# Patient Record
Sex: FEMALE | Race: BLACK OR AFRICAN AMERICAN | NOT HISPANIC OR LATINO | Employment: OTHER | URBAN - METROPOLITAN AREA
[De-identification: names, ages, dates, MRNs, and addresses within clinical notes are randomized per-mention and may not be internally consistent; named-entity substitution may affect disease eponyms.]

---

## 2018-04-18 ENCOUNTER — HOSPITAL ENCOUNTER (EMERGENCY)
Facility: HOSPITAL | Age: 53
Discharge: HOME | End: 2018-04-18
Attending: FAMILY MEDICINE
Payer: MEDICAID

## 2018-04-18 VITALS
HEIGHT: 65 IN | BODY MASS INDEX: 40.98 KG/M2 | SYSTOLIC BLOOD PRESSURE: 186 MMHG | DIASTOLIC BLOOD PRESSURE: 89 MMHG | OXYGEN SATURATION: 98 % | HEART RATE: 77 BPM | WEIGHT: 246 LBS | RESPIRATION RATE: 18 BRPM | TEMPERATURE: 99 F

## 2018-04-18 DIAGNOSIS — H66.92 ACUTE LEFT OTITIS MEDIA: ICD-10-CM

## 2018-04-18 DIAGNOSIS — R51.9 SINUS HEADACHE: Primary | ICD-10-CM

## 2018-04-18 PROCEDURE — 99283 EMERGENCY DEPT VISIT LOW MDM: CPT | Mod: ,,, | Performed by: FAMILY MEDICINE

## 2018-04-18 PROCEDURE — 99283 EMERGENCY DEPT VISIT LOW MDM: CPT

## 2018-04-18 RX ORDER — BUTALBITAL, ACETAMINOPHEN AND CAFFEINE 50; 325; 40 MG/1; MG/1; MG/1
1-2 TABLET ORAL EVERY 6 HOURS PRN
Qty: 18 TABLET | Refills: 0 | Status: SHIPPED | OUTPATIENT
Start: 2018-04-18

## 2018-04-18 RX ORDER — AMOXICILLIN AND CLAVULANATE POTASSIUM 875; 125 MG/1; MG/1
1 TABLET, FILM COATED ORAL 2 TIMES DAILY
Qty: 20 TABLET | Refills: 0 | Status: SHIPPED | OUTPATIENT
Start: 2018-04-18 | End: 2018-04-28

## 2018-04-18 NOTE — ED NOTES
Patient identifiers verified and correct for Ms Mercado  C/C: Left sided headache, left ear pain   APPEARANCE: awake and alert in NAD.  SKIN: warm, dry and intact. No breakdown or bruising.  MUSCULOSKELETAL: Patient moving all extremities spontaneously, no obvious swelling or deformities noted. Ambulates independently.  RESPIRATORY: Denies shortness of breath.Respirations unlabored.   CARDIAC: Denies CP, 2+ distal pulses; no peripheral edema  ABDOMEN: S/ND/NT, Positive nausea, emesis last night  : voids spontaneously, denies difficulty  Neurologic: AAO x 4; follows commands equal strength in all extremities; denies numbness/tingling. Positive dizziness, positive weakness, headache behind left eye, left neck,left ear.

## 2018-04-18 NOTE — ED TRIAGE NOTES
Patient statesback of left eye pain, left head and  left neck pain with entire body aches onset last night. ALso concerned about left ear pain.Last Percocet 10 mg and Soma yesterday. Last use crack cocaine yesterday

## 2018-04-18 NOTE — ED PROVIDER NOTES
[id number] Date: 2018    SCRIBE #1 NOTE: I, Sarah Chang, am scribing for, and in the presence of,  Dr. Cohen. I have scribed the following portions of the note - Other sections scribed: HPI, ROS, PE.       History     Chief Complaint   Patient presents with    Headache     Time patient was seen by the provider: 10:41 AM      The patient is a 52 y.o. female with co-morbidities including: HLD, HTN, who presents to the ED with a complaint of headache that started yesterday. The patient reports headache spread all over head, face, and neck. Associated symptoms include chills, 2 x of vomiting, light-headedness, weakness, cough, left ear pain. Patient states she took tylenol last night giving no relief. She has history of sinus problems.            The history is provided by the patient and medical records.     Review of patient's allergies indicates:  No Known Allergies  Past Medical History:   Diagnosis Date    Arthritis     Asthma     as child    Hyperlipemia     Hypertension      Past Surgical History:   Procedure Laterality Date     SECTION, CLASSIC       Family History   Problem Relation Age of Onset    Stroke Mother     Hypertension Mother     Heart attack Father      Social History   Substance Use Topics    Smoking status: Current Some Day Smoker     Packs/day: 0.50     Types: Cigarettes    Smokeless tobacco: Never Used    Alcohol use Yes      Comment: occas, none recently     Review of Systems   Constitutional: Positive for chills. Negative for fever.   HENT: Positive for ear pain (left). Negative for sore throat.    Respiratory: Positive for cough. Negative for shortness of breath.    Cardiovascular: Negative for chest pain.   Gastrointestinal: Positive for vomiting. Negative for nausea.   Genitourinary: Negative for dysuria.   Musculoskeletal: Negative for back pain.   Skin: Negative for rash.   Neurological: Positive for weakness, light-headedness and headaches.    Hematological: Does not bruise/bleed easily.       Physical Exam     Initial Vitals [04/18/18 1012]   BP Pulse Resp Temp SpO2   (!) 186/89 77 18 98.9 °F (37.2 °C) 98 %      MAP       121.33         Physical Exam    Nursing note and vitals reviewed.  Constitutional: She appears well-developed and well-nourished. No distress.   HENT:   Head: Normocephalic and atraumatic.   Left Ear: Tympanic membrane is bulging.   Mouth/Throat: Oropharynx is clear and moist.   Fluid in left middle ear. Post-nasal drip    Eyes: EOM are normal. Pupils are equal, round, and reactive to light.   Neck: Normal range of motion. Neck supple.   Cardiovascular: Normal rate, regular rhythm and normal heart sounds.   Pulmonary/Chest: Breath sounds normal. No respiratory distress. She has no wheezes.   Tenderness to percussion.    Abdominal: Soft. Bowel sounds are normal. She exhibits no distension. There is no tenderness.   Musculoskeletal: Normal range of motion. She exhibits no edema or tenderness.   No palpable edema   Neurological: She is alert and oriented to person, place, and time. She has normal strength.   Skin: Skin is warm and dry. No rash noted.         ED Course   Procedures  Labs Reviewed - No data to display          Medical Decision Making:   History:   Old Medical Records: I decided to obtain old medical records.  ED Management:  No acute CNS pathology suspected after reviewing patient's history and exam.  We'll manage sinusitis with Augmentin and use Fioricet for when necessary headache pain.  Patient stable for discharge            Scribe Attestation:   Scribe #1: I performed the above scribed service and the documentation accurately describes the services I performed. I attest to the accuracy of the note.               Clinical Impression:   The primary encounter diagnosis was Sinus headache. A diagnosis of Acute left otitis media was also pertinent to this visit.    Disposition:   Disposition: Discharged  Condition:  Stable                        Madhav Cohen MD  04/18/18 1128

## 2018-04-18 NOTE — PROVIDER PROGRESS NOTES - EMERGENCY DEPT.
Encounter Date: 4/18/2018    ED Physician Progress Notes             Patient not in room @ 10:35 AM

## 2019-08-29 ENCOUNTER — HOSPITAL ENCOUNTER (EMERGENCY)
Facility: HOSPITAL | Age: 54
Discharge: HOME | End: 2019-08-29
Attending: EMERGENCY MEDICINE
Payer: MEDICAID

## 2019-08-29 VITALS
WEIGHT: 234 LBS | SYSTOLIC BLOOD PRESSURE: 244 MMHG | HEART RATE: 75 BPM | OXYGEN SATURATION: 95 % | TEMPERATURE: 99 F | BODY MASS INDEX: 38.99 KG/M2 | DIASTOLIC BLOOD PRESSURE: 110 MMHG | RESPIRATION RATE: 20 BRPM | HEIGHT: 65 IN

## 2019-08-29 DIAGNOSIS — R06.02 SHORTNESS OF BREATH: ICD-10-CM

## 2019-08-29 DIAGNOSIS — J44.1 CHRONIC OBSTRUCTIVE PULMONARY DISEASE WITH ACUTE EXACERBATION: Primary | ICD-10-CM

## 2019-08-29 LAB
ALBUMIN SERPL BCP-MCNC: 3.5 G/DL (ref 3.5–5.2)
ALP SERPL-CCNC: 95 U/L (ref 55–135)
ALT SERPL W/O P-5'-P-CCNC: 21 U/L (ref 10–44)
ANION GAP SERPL CALC-SCNC: 12 MMOL/L (ref 8–16)
AST SERPL-CCNC: 28 U/L (ref 10–40)
BASOPHILS # BLD AUTO: 0.03 K/UL (ref 0–0.2)
BASOPHILS NFR BLD: 0.2 % (ref 0–1.9)
BILIRUB SERPL-MCNC: 0.3 MG/DL (ref 0.1–1)
BUN SERPL-MCNC: 18 MG/DL (ref 6–20)
CALCIUM SERPL-MCNC: 9.4 MG/DL (ref 8.7–10.5)
CHLORIDE SERPL-SCNC: 105 MMOL/L (ref 95–110)
CO2 SERPL-SCNC: 24 MMOL/L (ref 23–29)
CREAT SERPL-MCNC: 0.9 MG/DL (ref 0.5–1.4)
DIFFERENTIAL METHOD: ABNORMAL
EOSINOPHIL # BLD AUTO: 0 K/UL (ref 0–0.5)
EOSINOPHIL NFR BLD: 0 % (ref 0–8)
ERYTHROCYTE [DISTWIDTH] IN BLOOD BY AUTOMATED COUNT: 13.2 % (ref 11.5–14.5)
EST. GFR  (AFRICAN AMERICAN): >60 ML/MIN/1.73 M^2
EST. GFR  (NON AFRICAN AMERICAN): >60 ML/MIN/1.73 M^2
GLUCOSE SERPL-MCNC: 141 MG/DL (ref 70–110)
HCT VFR BLD AUTO: 38.5 % (ref 37–48.5)
HGB BLD-MCNC: 12.8 G/DL (ref 12–16)
IMM GRANULOCYTES # BLD AUTO: 0.16 K/UL (ref 0–0.04)
IMM GRANULOCYTES NFR BLD AUTO: 0.8 % (ref 0–0.5)
LYMPHOCYTES # BLD AUTO: 1.5 K/UL (ref 1–4.8)
LYMPHOCYTES NFR BLD: 8.1 % (ref 18–48)
MCH RBC QN AUTO: 29.2 PG (ref 27–31)
MCHC RBC AUTO-ENTMCNC: 33.2 G/DL (ref 32–36)
MCV RBC AUTO: 88 FL (ref 82–98)
MONOCYTES # BLD AUTO: 0.8 K/UL (ref 0.3–1)
MONOCYTES NFR BLD: 4 % (ref 4–15)
NEUTROPHILS # BLD AUTO: 16.4 K/UL (ref 1.8–7.7)
NEUTROPHILS NFR BLD: 86.9 % (ref 38–73)
NRBC BLD-RTO: 0 /100 WBC
PLATELET # BLD AUTO: 253 K/UL (ref 150–350)
PMV BLD AUTO: 11.2 FL (ref 9.2–12.9)
POTASSIUM SERPL-SCNC: 4 MMOL/L (ref 3.5–5.1)
PROT SERPL-MCNC: 7.5 G/DL (ref 6–8.4)
RBC # BLD AUTO: 4.38 M/UL (ref 4–5.4)
SODIUM SERPL-SCNC: 141 MMOL/L (ref 136–145)
WBC # BLD AUTO: 18.83 K/UL (ref 3.9–12.7)

## 2019-08-29 PROCEDURE — 80053 COMPREHEN METABOLIC PANEL: CPT

## 2019-08-29 PROCEDURE — 93010 EKG 12-LEAD: ICD-10-PCS | Mod: ,,, | Performed by: INTERNAL MEDICINE

## 2019-08-29 PROCEDURE — 93010 ELECTROCARDIOGRAM REPORT: CPT | Mod: ,,, | Performed by: INTERNAL MEDICINE

## 2019-08-29 PROCEDURE — 94640 AIRWAY INHALATION TREATMENT: CPT

## 2019-08-29 PROCEDURE — 85025 COMPLETE CBC W/AUTO DIFF WBC: CPT

## 2019-08-29 PROCEDURE — 63600175 PHARM REV CODE 636 W HCPCS: Performed by: PHYSICIAN ASSISTANT

## 2019-08-29 PROCEDURE — 99285 EMERGENCY DEPT VISIT HI MDM: CPT | Mod: 25

## 2019-08-29 PROCEDURE — 36000 PLACE NEEDLE IN VEIN: CPT

## 2019-08-29 PROCEDURE — 99288: ICD-10-PCS | Mod: ,,, | Performed by: PHYSICIAN ASSISTANT

## 2019-08-29 PROCEDURE — 93005 ELECTROCARDIOGRAM TRACING: CPT

## 2019-08-29 PROCEDURE — 99288 DIRECT ADVANCED LIFE SUPPORT: CPT | Mod: ,,, | Performed by: PHYSICIAN ASSISTANT

## 2019-08-29 PROCEDURE — 25000242 PHARM REV CODE 250 ALT 637 W/ HCPCS: Performed by: PHYSICIAN ASSISTANT

## 2019-08-29 PROCEDURE — 25000003 PHARM REV CODE 250: Performed by: PHYSICIAN ASSISTANT

## 2019-08-29 RX ORDER — NIFEDIPINE 30 MG/1
60 TABLET, EXTENDED RELEASE ORAL
Status: COMPLETED | OUTPATIENT
Start: 2019-08-29 | End: 2019-08-29

## 2019-08-29 RX ORDER — PREDNISONE 20 MG/1
40 TABLET ORAL
Status: COMPLETED | OUTPATIENT
Start: 2019-08-29 | End: 2019-08-29

## 2019-08-29 RX ORDER — NIFEDIPINE 30 MG/1
60 TABLET, EXTENDED RELEASE ORAL DAILY
Status: DISCONTINUED | OUTPATIENT
Start: 2019-08-30 | End: 2019-08-29

## 2019-08-29 RX ORDER — HYDROCHLOROTHIAZIDE 25 MG/1
25 TABLET ORAL
Status: COMPLETED | OUTPATIENT
Start: 2019-08-29 | End: 2019-08-29

## 2019-08-29 RX ORDER — DOXYCYCLINE HYCLATE 100 MG
100 TABLET ORAL
Status: COMPLETED | OUTPATIENT
Start: 2019-08-29 | End: 2019-08-29

## 2019-08-29 RX ORDER — IPRATROPIUM BROMIDE AND ALBUTEROL SULFATE 2.5; .5 MG/3ML; MG/3ML
3 SOLUTION RESPIRATORY (INHALATION)
Status: COMPLETED | OUTPATIENT
Start: 2019-08-29 | End: 2019-08-29

## 2019-08-29 RX ADMIN — IPRATROPIUM BROMIDE AND ALBUTEROL SULFATE 3 ML: .5; 3 SOLUTION RESPIRATORY (INHALATION) at 06:08

## 2019-08-29 RX ADMIN — PREDNISONE 40 MG: 20 TABLET ORAL at 07:08

## 2019-08-29 RX ADMIN — NIFEDIPINE 60 MG: 30 TABLET, FILM COATED, EXTENDED RELEASE ORAL at 07:08

## 2019-08-29 RX ADMIN — DOXYCYCLINE HYCLATE 100 MG: 100 TABLET, COATED ORAL at 07:08

## 2019-08-29 RX ADMIN — HYDROCHLOROTHIAZIDE 25 MG: 25 TABLET ORAL at 07:08

## 2019-08-29 NOTE — DISCHARGE INSTRUCTIONS
Fill and start all medications prescribed earlier today by [facility].

## 2019-08-29 NOTE — ED NOTES
Patient identifiers verified and correct for Ms Mercado  C/C: SOB SEE NN  APPEARANCE: awake and alert in NAD.  SKIN: warm, dry and intact. No breakdown or bruising.  MUSCULOSKELETAL: Patient moving all extremities spontaneously, no obvious swelling or deformities noted. Ambulates independently.  RESPIRATORY: Denies shortness of breath.Respirations sl labored at rest. Positive cough, NPC,Positive fever PTA, Breath Sounds audible rhonchi  CARDIAC: Denies CP, 2+ distal pulses; no peripheral edema  ABDOMEN: Upper abd  Pain gen , worse with cough, states nausea PTA, none upon admit   : voids spontaneously, denies difficulty  Neurologic: AAO x 4; follows commands equal strength in all extremities; denies numbness/tingling. Denies dizziness Positive gen weakness, sore throat, runny nose, headache

## 2019-08-29 NOTE — EKG INTERPRETATIONS - EMERGENCY DEPT.
Independently interpreted by MD:  Rate 63, NSR, no stemi, no ectopy, biatrial enlargement, LVH, T wave abnormality in V3-6, long QT

## 2019-08-29 NOTE — ED PROVIDER NOTES
Encounter Date: 2019       History     Chief Complaint   Patient presents with    Nasal Congestion     with cough x 5 days. States just released from hospital in [facility] but states doesnt feel any better. Hx COPD     55 y/o AAF with history of COPD, hypertension, anxiety presents to the ED complaining of shortness of breath for the past 5 days.  She reports chest tightness, fever, dry cough, sore throat, rhinorrhea, headache. She was admitted last night at [facility] and discharged this morning.  She states that she does not feel any better, in fact she feels worse so drove directly from the hospital to our ED.  She was prescribed antibiotics, steroids but she did not pick any of these medications up.  She has not taken any of her at-home medications today.  She is not on home oxygen.  She denies any sick contacts.  She denies chest pain, abdominal pain, nausea, vomiting, diarrhea, dysuria.  She smokes 1/2 pack per day and smokes cocaine, last use 4 days ago.    The history is provided by the patient.     Review of patient's allergies indicates:  No Known Allergies  Past Medical History:   Diagnosis Date    Arthritis     Asthma     as child    Hyperlipemia     Hypertension      Past Surgical History:   Procedure Laterality Date     SECTION, CLASSIC       Family History   Problem Relation Age of Onset    Stroke Mother     Hypertension Mother     Heart attack Father      Social History     Tobacco Use    Smoking status: Current Some Day Smoker     Packs/day: 0.50     Types: Cigarettes    Smokeless tobacco: Never Used   Substance Use Topics    Alcohol use: Yes     Comment: occas, none recently    Drug use: Yes     Types: Cocaine     Comment: smoke crack cocaine 4 days PTA     Review of Systems   Constitutional: Positive for chills and fever.   HENT: Positive for congestion, rhinorrhea and sore throat.    Respiratory: Positive for cough, chest tightness, shortness of  breath and wheezing.    Cardiovascular: Negative for chest pain and palpitations.   Gastrointestinal: Negative for abdominal pain, constipation, diarrhea, nausea and vomiting.   Genitourinary: Negative for dysuria and hematuria.   Musculoskeletal: Negative for back pain, neck pain and neck stiffness.   Skin: Negative for rash and wound.   Neurological: Positive for headaches. Negative for numbness.   Psychiatric/Behavioral: Negative for confusion.       Physical Exam     Initial Vitals   BP Pulse Resp Temp SpO2   08/29/19 1656 08/29/19 1652 08/29/19 1652 08/29/19 1652 08/29/19 1652   (!) 244/110 75 (!) 22 99.1 °F (37.3 °C) 95 %      MAP       --                Physical Exam    Nursing note and vitals reviewed.  Constitutional: She appears well-developed and well-nourished. She is not diaphoretic. No distress.   HENT:   Head: Normocephalic and atraumatic.   Neck: Normal range of motion. Neck supple.   Cardiovascular: Normal rate, regular rhythm and normal heart sounds. Exam reveals no gallop and no friction rub.    No murmur heard.  Pulmonary/Chest: Breath sounds normal. She has no wheezes. She has no rhonchi. She has no rales. She exhibits no tenderness.   Abdominal: Soft. Bowel sounds are normal. There is no tenderness. There is no rebound and no guarding.   Musculoskeletal: Normal range of motion.   Neurological: She is alert and oriented to person, place, and time.   Skin: Skin is warm and dry. No rash noted. No erythema.   Psychiatric: She has a normal mood and affect.         ED Course   Procedures  Labs Reviewed   CBC W/ AUTO DIFFERENTIAL - Abnormal; Notable for the following components:       Result Value    WBC 18.83 (*)     Immature Granulocytes 0.8 (*)     Gran # (ANC) 16.4 (*)     Immature Grans (Abs) 0.16 (*)     Gran% 86.9 (*)     Lymph% 8.1 (*)     All other components within normal limits   COMPREHENSIVE METABOLIC PANEL - Abnormal; Notable for the following components:    Glucose 141 (*)     All other  components within normal limits          Imaging Results          X-Ray Chest PA And Lateral (Final result)  Result time 08/29/19 18:20:09    Final result by Aquilino Mccain MD (08/29/19 18:20:09)                 Impression:      No acute cardiopulmonary process.      Electronically signed by: Aquilino Mccain MD  Date:    08/29/2019  Time:    18:20             Narrative:    EXAMINATION:  XR CHEST PA AND LATERAL    CLINICAL HISTORY:  shortness of breath;    TECHNIQUE:  PA and lateral views of the chest were performed.    COMPARISON:  08/02/2016    FINDINGS:  There is no consolidation, effusion, or pneumothorax.  Discoid atelectasis/scarring noted in the right lung base.  Cardiomediastinal silhouette is unremarkable.  Regional osseous structures are unremarkable.                                 Medical Decision Making:   History:   Old Medical Records: I decided to obtain old medical records.  Old Records Summarized: records from another hospital.       <> Summary of Records: Admitted at [facility] last night, discharged this morning for COPD exacerbation. Given duoneb and IV solumedrol. Discharged with rx for doxycycline, prednisone, albuterol, nifedipine  Clinical Tests:   Lab Tests: Ordered and Reviewed  Radiological Study: Ordered and Reviewed  Medical Tests: Reviewed and Ordered       APC / Resident Notes:   55 y/o AAF with history of COPD, hypertension, anxiety presents to the ED complaining of shortness of breath for the past 5 days.  Hypertensive at 244/110.  She has not taken any of her at-home medications today.  No chest wall tenderness.  Lungs are clear without any wheezing but was receiving steroids and breathing treatments at [facility] today.  Abdomen is soft and nontender. Differential diagnosis includes but is not limited to pneumonia, COPD exacerbation, anemia, dehydration.  Will check labs, chest x-ray, EKG.  Will give home BP meds, DuoNeb, po prednisone.    Leukocytosis noted - given IV  solumedrol yesterday. CMP unremarkable.     CXR independently reviewed - no infiltrate.    She was given DuoNeb, prednisone, doxycycline, BP medications in the ED.     She is on appropriate therapy from OSH. I do not feel that she needs any further labs or imaging at this time. Stable for discharge.     She left the ED before discharge paperwork could be provided. She was discharged without any new prescriptions - instructed to fill all prescriptions provided by OSH.  She will follow up with her PCP.  All of the patient's questions were answered.  I reviewed the patient's chart, labs, and imaging and discussed the case with my supervising physician.                      Clinical Impression:       ICD-10-CM ICD-9-CM   1. Chronic obstructive pulmonary disease with acute exacerbation J44.1 491.21   2. Shortness of breath R06.02 786.05         Disposition:   Disposition: Discharged  Condition: Stable                        Chyna Elena PA-C  08/29/19 [id number]

## 2019-08-29 NOTE — ED TRIAGE NOTES
Patient states SOB onset 5 days ago, states she left another hospital 1 hour PTA and does not feel any better. NPC, states fever to 101 at 0200. Last Percocet at 1200 today. Has not used in [facility] in several years.

## 2019-08-30 NOTE — ED NOTES
Pt left without discharge paper work and vitals being done. Called name multiple times and checked restrooms.

## 2024-08-04 ENCOUNTER — HOSPITAL ENCOUNTER (EMERGENCY)
Facility: HOSPITAL | Age: 59
Discharge: HOME | End: 2024-08-05
Attending: STUDENT IN AN ORGANIZED HEALTH CARE EDUCATION/TRAINING PROGRAM
Payer: MEDICAID

## 2024-08-04 DIAGNOSIS — S32.040A CLOSED COMPRESSION FRACTURE OF L4 LUMBAR VERTEBRA, INITIAL ENCOUNTER: Primary | ICD-10-CM

## 2024-08-04 DIAGNOSIS — R52 PAIN: ICD-10-CM

## 2024-08-04 DIAGNOSIS — R60.0 LOWER EXTREMITY EDEMA: ICD-10-CM

## 2024-08-04 LAB
ALBUMIN SERPL BCP-MCNC: 3.5 G/DL (ref 3.5–5.2)
ALP SERPL-CCNC: 82 U/L (ref 55–135)
ALT SERPL W/O P-5'-P-CCNC: 6 U/L (ref 10–44)
ANION GAP SERPL CALC-SCNC: 10 MMOL/L (ref 8–16)
AST SERPL-CCNC: 9 U/L (ref 10–40)
BASOPHILS # BLD AUTO: 0.03 K/UL (ref 0–0.2)
BASOPHILS NFR BLD: 0.5 % (ref 0–1.9)
BILIRUB SERPL-MCNC: 0.3 MG/DL (ref 0.1–1)
BILIRUB UR QL STRIP: NEGATIVE
BUN SERPL-MCNC: 18 MG/DL (ref 6–20)
CALCIUM SERPL-MCNC: 9.3 MG/DL (ref 8.7–10.5)
CHLORIDE SERPL-SCNC: 112 MMOL/L (ref 95–110)
CLARITY UR REFRACT.AUTO: CLEAR
CO2 SERPL-SCNC: 20 MMOL/L (ref 23–29)
COLOR UR AUTO: YELLOW
CREAT SERPL-MCNC: 0.8 MG/DL (ref 0.5–1.4)
DIFFERENTIAL METHOD BLD: ABNORMAL
EOSINOPHIL # BLD AUTO: 0.3 K/UL (ref 0–0.5)
EOSINOPHIL NFR BLD: 3.9 % (ref 0–8)
ERYTHROCYTE [DISTWIDTH] IN BLOOD BY AUTOMATED COUNT: 13.7 % (ref 11.5–14.5)
EST. GFR  (NO RACE VARIABLE): >60 ML/MIN/1.73 M^2
GLUCOSE SERPL-MCNC: 103 MG/DL (ref 70–110)
GLUCOSE UR QL STRIP: NEGATIVE
HCT VFR BLD AUTO: 38.7 % (ref 37–48.5)
HGB BLD-MCNC: 12.3 G/DL (ref 12–16)
HGB UR QL STRIP: NEGATIVE
IMM GRANULOCYTES # BLD AUTO: 0.01 K/UL (ref 0–0.04)
IMM GRANULOCYTES NFR BLD AUTO: 0.2 % (ref 0–0.5)
KETONES UR QL STRIP: NEGATIVE
LEUKOCYTE ESTERASE UR QL STRIP: NEGATIVE
LYMPHOCYTES # BLD AUTO: 1.9 K/UL (ref 1–4.8)
LYMPHOCYTES NFR BLD: 30 % (ref 18–48)
MCH RBC QN AUTO: 26.7 PG (ref 27–31)
MCHC RBC AUTO-ENTMCNC: 31.8 G/DL (ref 32–36)
MCV RBC AUTO: 84 FL (ref 82–98)
MONOCYTES # BLD AUTO: 0.5 K/UL (ref 0.3–1)
MONOCYTES NFR BLD: 7.7 % (ref 4–15)
NEUTROPHILS # BLD AUTO: 3.7 K/UL (ref 1.8–7.7)
NEUTROPHILS NFR BLD: 57.7 % (ref 38–73)
NITRITE UR QL STRIP: NEGATIVE
NRBC BLD-RTO: 0 /100 WBC
PH UR STRIP: 5 [PH] (ref 5–8)
PLATELET # BLD AUTO: 252 K/UL (ref 150–450)
PMV BLD AUTO: 9.9 FL (ref 9.2–12.9)
POTASSIUM SERPL-SCNC: 3.4 MMOL/L (ref 3.5–5.1)
PROT SERPL-MCNC: 7.1 G/DL (ref 6–8.4)
PROT UR QL STRIP: NEGATIVE
RBC # BLD AUTO: 4.61 M/UL (ref 4–5.4)
SODIUM SERPL-SCNC: 142 MMOL/L (ref 136–145)
SP GR UR STRIP: 1.02 (ref 1–1.03)
URN SPEC COLLECT METH UR: NORMAL
WBC # BLD AUTO: 6.36 K/UL (ref 3.9–12.7)

## 2024-08-04 PROCEDURE — 81003 URINALYSIS AUTO W/O SCOPE: CPT

## 2024-08-04 PROCEDURE — 80053 COMPREHEN METABOLIC PANEL: CPT

## 2024-08-04 PROCEDURE — 85025 COMPLETE CBC W/AUTO DIFF WBC: CPT

## 2024-08-04 PROCEDURE — 99285 EMERGENCY DEPT VISIT HI MDM: CPT | Mod: 25

## 2024-08-04 NOTE — ED NOTES
LOC: The patient is awake and alert; oriented x 3 and speaking appropriately.  APPEARANCE: Patient resting comfortably, patient is clean and well groomed  SKIN: warm and dry, normal skin turgor & moist mucus membranes, skin intact, no breakdown noted.  MUSCULOSKELETAL: Patient moving all extremities well, no obvious swelling or deformities noted, rt arm weakness post CVA  RESPIRATORY: Airway is open and patent,  respirations are spontaneous, normal effort and rate  CARDIAC: Patient has a normal rate, peripheral edema noted to both upper and lower legs, capillary refill < 3 seconds; No complaints of chest pain   ABDOMEN: Soft and non tender to palpation, no distention noted. C/o urinary  incontinence when she stands.9 began after fall one week ago)

## 2024-08-04 NOTE — ED TRIAGE NOTES
Fell last wk  and  fell onto her buttocks while trying to get into a truck. Has been experiencing  urinary incontinence when she stands which started after the fall . C/O edema to  both upper and lower legs w/ pain in knees. Can walk short distances  an uses a motorized w/c for distances. Has weakness on rt side post CVA.

## 2024-08-04 NOTE — ED PROVIDER NOTES
Encounter Date: 2024       History     Chief Complaint   Patient presents with    Knee Pain     Denies fever, chills.      58-year-old female presents to the emergency department with multiple complaints. The patient reports experiencing left knee pain and bilateral lower extremity edema, with the left leg being more affected than the right. She is having difficulty walking due to the pain. She usually uses an electric wheelchair for long distances but can typically walk short distances.  She mentions that about two weeks ago, she experienced a ground-level fall, landing on her buttocks. Initially, she had lower lumbar discomfort, which has since improved. However, she now reports that she urinates on herself primarily in the morning when she gets up to go to the restroom. The patient denies any numbness in the lower extremities.  No saddle anesthesia.  No headaches.  No vision changes.        Review of patient's allergies indicates:  No Known Allergies  Past Medical History:   Diagnosis Date    Arthritis     Asthma     as child    CVA (cerebral vascular accident)     Hyperlipemia     Hypertension      Past Surgical History:   Procedure Laterality Date     SECTION, CLASSIC       Family History   Problem Relation Name Age of Onset    Stroke Mother      Hypertension Mother      Heart attack Father       Social History     Tobacco Use    Smoking status: Some Days     Current packs/day: 0.50     Types: Cigarettes    Smokeless tobacco: Never   Substance Use Topics    Alcohol use: Yes     Comment: occas, none recently    Drug use: Yes     Types: Cocaine     Comment: smoke crack cocaine 4 days PTA     Review of Systems  See HPI  Physical Exam     Initial Vitals [24 1519]   BP Pulse Resp Temp SpO2   137/72 82 16 99.1 °F (37.3 °C) 95 %      MAP       --         Physical Exam    Vitals reviewed.  Constitutional: She appears well-developed and well-nourished.   HENT:   Head: Normocephalic and atraumatic.    Eyes: Conjunctivae and EOM are normal.   Neck:   Normal range of motion.  Cardiovascular:  Normal rate.           Pulmonary/Chest: No respiratory distress.   Abdominal: Abdomen is soft. She exhibits no distension.   Musculoskeletal:         General: Normal range of motion.      Cervical back: Normal range of motion.      Comments: Intact range of motion left knee joint with flexion-extension, minimal edema of the left leg compared to the right.  No overlying skin changes such as abrasion erythema induration.  The joint is not warm to the touch.    Midline lumbar discomfort on exam, without overlying skin changes.     Neurological: She is alert and oriented to person, place, and time.   Skin: Skin is warm and dry.   Psychiatric: She has a normal mood and affect. Thought content normal.         ED Course   Procedures  Labs Reviewed   CBC W/ AUTO DIFFERENTIAL - Abnormal       Result Value    WBC 6.36      RBC 4.61      Hemoglobin 12.3      Hematocrit 38.7      MCV 84      MCH 26.7 (*)     MCHC 31.8 (*)     RDW 13.7      Platelets 252      MPV 9.9      Immature Granulocytes 0.2      Gran # (ANC) 3.7      Immature Grans (Abs) 0.01      Lymph # 1.9      Mono # 0.5      Eos # 0.3      Baso # 0.03      nRBC 0      Gran % 57.7      Lymph % 30.0      Mono % 7.7      Eosinophil % 3.9      Basophil % 0.5      Differential Method Automated     COMPREHENSIVE METABOLIC PANEL - Abnormal    Sodium 142      Potassium 3.4 (*)     Chloride 112 (*)     CO2 20 (*)     Glucose 103      BUN 18      Creatinine 0.8      Calcium 9.3      Total Protein 7.1      Albumin 3.5      Total Bilirubin 0.3      Alkaline Phosphatase 82      AST 9 (*)     ALT 6 (*)     eGFR >60.0      Anion Gap 10     URINALYSIS, REFLEX TO URINE CULTURE    Specimen UA Urine, Clean Catch      Color, UA Yellow      Appearance, UA Clear      pH, UA 5.0      Specific Gravity, UA 1.025      Protein, UA Negative      Glucose, UA Negative      Ketones, UA Negative       Bilirubin (UA) Negative      Occult Blood UA Negative      Nitrite, UA Negative      Leukocytes, UA Negative      Narrative:     Specimen Source->Urine          Imaging Results              X-ray Thoracolumbar Spine Lateral Supine and Lateral Upright (In process)                      X-ray Thoracolumbar Spine Lateral Supine and Lateral Upright (Final result)  Result time 08/05/24 00:00:34      Final result by Juan Abreu MD (08/05/24 00:00:34)                   Impression:      As above      Electronically signed by: Juan Abreu  Date:    08/05/2024  Time:    00:00               Narrative:    EXAMINATION:  XR THORACOLUMBAR SPINE LATERAL SUPINE AND LATERAL UPRIGHT    CLINICAL HISTORY:  L4 compression fx;    TECHNIQUE:  Supine and erect lateral views of the thoracolumbar spine were obtained.    COMPARISON:  None    FINDINGS:  No significant motion between supine and erect views of the lumbar spine with compression deformity at L4.                                       MRI Lumbar Spine Without Contrast (Final result)  Result time 08/04/24 20:23:08      Final result by Juan Abreu MD (08/04/24 20:23:08)                   Impression:      Mild bone marrow edema in the compression fracture of L4 suggesting subacute to acute nature.    No retropulsion.    Moderate central and lateral recess and subarticular stenosis at L4-5 apparently due to lumbar degeneration and congenital short pedicles.      Electronically signed by: Juan Abreu  Date:    08/04/2024  Time:    20:23               Narrative:    EXAMINATION:  MRI LUMBAR SPINE WITHOUT CONTRAST    CLINICAL HISTORY:  Compression fracture, lumbar;    TECHNIQUE:  Multiplanar, multisequence MR images were acquired from the thoracolumbar junction to the sacrum without the administration of contrast.    COMPARISON:  CT lumbar spine, 08/04/2024 at 16:43 hours    FINDINGS:  There is no bone marrow edema in the compression of the superior endplate of  L3.    Mild bone marrow edema signal in the L4 vertebral body.  No retropulsion.    There is moderate lateral recess and foraminal compromise at the L4-5 level due to short pedicles and hypertrophic facet and ligamentous changes posteriorly.  No retropulsion.  Mild concentric disc bulge.    Mild disc bulge at L2-3 L3-4 and L5-S1 with no significant stenosis.  Exiting nerve roots appear un impinged with the exception of the L4 nerve root at L4-5 in the subarticular foramen from hypertrophic changes.                                       CT Cervical Spine Without Contrast (Final result)  Result time 08/04/24 20:00:59      Final result by William Paige MD (08/04/24 20:00:59)                   Impression:      1. Allowing for motion artifact, no convincing acute displaced fracture or dislocation of the cervical spine.  Please see above for additional findings.      Electronically signed by: William Paige MD  Date:    08/04/2024  Time:    20:00               Narrative:    EXAMINATION:  CT CERVICAL SPINE WITHOUT CONTRAST    CLINICAL HISTORY:  Neck trauma, midline tenderness (Age 16-64y);    TECHNIQUE:  Low dose axial images, sagittal and coronal reformations were performed though the cervical spine.  Contrast was not administered.    COMPARISON:  None    FINDINGS:  There is motion artifact.    The visualized portions of the lung apices are remarkable for biapical atelectasis or scarring noting pulmonary emphysematous change.  The thyroid gland, parotid glands, and remaining visualized salivary glands are grossly unremarkable.  No significant tonsillar enlargement.  No significant cervical lymphadenopathy.  There is carotid vascular calcification.  The posterior paraspinous and hypopharyngeal soft tissues are unremarkable.    Sagittal reformatted imaging demonstrates adequate alignment of the cervical spine without significant vertebral body height loss or disc space height loss.  The facet joints are aligned.  No  acute displaced fracture or dislocation of the cervical spine.  Motion artifact limits evaluation for spondylitic changes.  Allowing for this, there is multilevel mild spinal canal stenosis and neuroforaminal narrowing noting mild to moderate spinal canal stenosis at C4-C5.  The airway is patent.                                       CT Head Without Contrast (Final result)  Result time 08/04/24 19:56:40      Final result by William Paige MD (08/04/24 19:56:40)                   Impression:      1. No acute intracranial abnormalities noting sequela of chronic microvascular ischemic change and senescent change.  2. Focus of suspected encephalomalacia within the left basal ganglia suggesting remote infarct.  No previous examinations are available for comparison, and technically remains age indeterminate.  Correlation with any focal symptomatology attributable to this region recommended.  Further evaluation as warranted.      Electronically signed by: William Paige MD  Date:    08/04/2024  Time:    19:56               Narrative:    EXAMINATION:  CT HEAD WITHOUT CONTRAST    CLINICAL HISTORY:  Head trauma, moderate-severe;    TECHNIQUE:  Low dose axial images were obtained through the head.  Coronal and sagittal reformations were also performed. Contrast was not administered.    COMPARISON:  None.    FINDINGS:  There is motion artifact.    There is generalized cerebral volume loss.  There is hypoattenuation in a periventricular fashion, likely sequela of chronic microvascular ischemic change.There is a focus of hypoattenuation involving the left basal ganglia, likely remote infarct.  There is no evidence of acute major vascular territory infarct, hemorrhage, or mass.  There is no hydrocephalus.  There are no abnormal extra-axial fluid collections.  The paranasal sinuses and mastoid air cells are clear, and there is no evidence of calvarial fracture.  The visualized soft tissues are unremarkable.                                        X-Ray Knee 3 View Left (Final result)  Result time 08/04/24 18:57:20      Final result by William Paige MD (08/04/24 18:57:20)                   Impression:      1. No convincing acute displaced fracture or dislocation of the knee noting degenerative changes.      Electronically signed by: William Paige MD  Date:    08/04/2024  Time:    18:57               Narrative:    EXAMINATION:  XR KNEE 3 VIEW LEFT    CLINICAL HISTORY:  Pain, unspecified    TECHNIQUE:  AP, lateral, and Merchant views of the left knee were performed.    COMPARISON:  None    FINDINGS:  Three views left knee.    There are degenerative changes of the knee noting prominent osteophytosis involving the lateral compartment.  No large knee joint effusion.  No radiopaque foreign body.                                       US Lower Extremity Veins Bilateral (Final result)  Result time 08/04/24 18:13:08      Final result by William Paige MD (08/04/24 18:13:08)                   Impression:      No evidence of deep venous thrombosis in either lower extremity.      Electronically signed by: William Paige MD  Date:    08/04/2024  Time:    18:13               Narrative:    EXAMINATION:  US LOWER EXTREMITY VEINS BILATERAL    CLINICAL HISTORY:  Localized edema    TECHNIQUE:  Duplex and color flow Doppler and dynamic compression was performed of the bilateral lower extremity veins was performed.    COMPARISON:  08/02/2016    FINDINGS:  Duplex and color flow Doppler evaluation does not reveal any evidence of acute venous thrombosis in the common femoral, superficial femoral, greater saphenous, popliteal, peroneal, anterior tibial and posterior tibial veins bilaterally.  There is no reflux to suggest valvular incompetence.                                        CT Lumbar Spine Without Contrast (Final result)  Result time 08/04/24 18:02:55      Final result by Juan Abreu MD (08/04/24 18:02:55)                    Impression:      Compression fractures of L3 and L4 appearing new since 2012.  While each may be acute or subacute, L4 appears the most acute.    No retropulsion or hematoma.    Lumbar stenosis at L3-4 and L4-5 mainly due to concentric disc bulge, short pedicles and hypertrophic posterior elements with subarticular foraminal components.    Consider further evaluation with MRI if clinically warranted.    This report was flagged in Epic as abnormal.      Electronically signed by: Juan Abreu  Date:    08/04/2024  Time:    18:02               Narrative:    EXAMINATION:  CT LUMBAR SPINE WITHOUT CONTRAST    CLINICAL HISTORY:  Back trauma, no prior imaging (Age >= 16y);    TECHNIQUE:  Low-dose axial, sagittal and coronal reformations are obtained through the lumbar spine.  Contrast was not administered.    COMPARISON:  Plain x-ray lumbar spine 08/17/2012    FINDINGS:  Alignment: Maintained.    Vertebral bodies: Compression deformities of L3 and L4 new since 2012.  The L4 compression deformity appears more acute.    Disc: Narrowing of most discs.    Degenerative changes: Mild central canal stenosis at L3-L4 from hypertrophic posterior element changes.  Subarticular foraminal narrowing due to short pedicles and hypertrophic facets.  At L4-L5, concentric disc protrusion, short pedicles and hypertrophic posterior ligamentous is and bony changes result in central and lateral recess stenosis with subarticular foraminal encroachment.    Paraspinal soft tissue: Unremarkable.    Visualized portion of the chest and abdomen: Unremarkable.                                       Medications - No data to display  Medical Decision Making  58-year-old female presents after experiencing ground level fall one-week ago  Initial CT of the lumbar spine revealed L3/L4 compression fracture.  There is a question of possible urinary incontinence discussed with Neurosurgery MRI was ordered.  No retropulsion or concern of cauda equina.  However  additional imaging was recommended by Neurosurgery.  Knee pain not consistent with gout or septic joint.  No obvious deformity.  Patient presents in her electric wheelchair which she typically uses for mobility.  At time of sign-out patient was pending x-ray and follow up with Neurosurgery team for disposition  Patient was signed out to evening physician    Amount and/or Complexity of Data Reviewed  Labs: ordered.  Radiology: ordered. Decision-making details documented in ED Course.               ED Course as of 08/05/24 0054   Sun Aug 04, 2024   1901 CT Lumbar Spine Without Contrast(!)  Reviewed and discussed with Neurosurgery, ordered MRI in additional CT head imaging [CR]      ED Course User Index  [CR] Zulema Schofield PA-C                           Clinical Impression:  Final diagnoses:  [R60.0] Lower extremity edema  [R52] Pain                 Zulema Schofield PA-C  08/05/24 0054

## 2024-08-05 VITALS
HEART RATE: 77 BPM | DIASTOLIC BLOOD PRESSURE: 79 MMHG | TEMPERATURE: 98 F | BODY MASS INDEX: 39.49 KG/M2 | RESPIRATION RATE: 18 BRPM | OXYGEN SATURATION: 98 % | SYSTOLIC BLOOD PRESSURE: 189 MMHG | WEIGHT: 237 LBS | HEIGHT: 65 IN

## 2024-08-05 PROBLEM — S32.040A COMPRESSION FRACTURE OF L4 VERTEBRA: Status: ACTIVE | Noted: 2024-08-05

## 2024-08-05 NOTE — PLAN OF CARE
ANGEL has seen and evaluated pt at bedside. Plan for upright / supine XR w TLSO brace. Full consult note to follow    MD Ihsan Pride - Neurosurgery  [facility]

## 2024-08-05 NOTE — DISCHARGE INSTRUCTIONS
Continue to take Tylenol for pain  Where based brace as instructed by Neurosurgery  You will need to follow up in clinic I have placed a referral and listed clinic information above  If you develop numbness in your lower legs or severe pain we presents to the emergency department

## 2024-08-05 NOTE — PROVIDER PROGRESS NOTES - EMERGENCY DEPT.
Encounter Date: 8/4/2024    ED Physician Progress Notes        Physician Note:   Assumed care of patient at 0100 pending repeat xray (initial performed without brace) and final Neurosurgery recommendations.     Repeat x-ray:  Compression deformities of L4 and L3 are again noted with no significant change between for supine and erect.  The remainder of the vertebral bodies appear stable as well.     Reviewed imaging with patient and family member at the bedside.  CT head findings noted, patient has a history of prior stroke, no acute signs or symptoms of stroke.    Reviewed imaging with Neurosurgery, brace in place.  Neurosurgery recommending discharge with outpatient follow-up.  Patient safe for discharge and outpatient follow up.  Advised scheduling appointment with PCP and return precautions given.  The patient understands and agrees with the plan.  All questions answered prior to discharge.

## 2024-08-05 NOTE — SUBJECTIVE & OBJECTIVE
"(Not in a hospital admission)      Review of patient's allergies indicates:  No Known Allergies    Past Medical History:   Diagnosis Date    Arthritis     Asthma     as child    CVA (cerebral vascular accident)     Hyperlipemia     Hypertension      Past Surgical History:   Procedure Laterality Date     SECTION, CLASSIC       Family History       Problem Relation (Age of Onset)    Heart attack Father    Hypertension Mother    Stroke Mother          Tobacco Use    Smoking status: Some Days     Current packs/day: 0.50     Types: Cigarettes    Smokeless tobacco: Never   Substance and Sexual Activity    Alcohol use: Yes     Comment: occas, none recently    Drug use: Yes     Types: Cocaine     Comment: smoke crack cocaine 4 days PTA    Sexual activity: Yes     Partners: Male     Birth control/protection: None     Review of Systems  Objective:     Weight: 107.5 kg (237 lb)  Body mass index is 39.44 kg/m².  Vital Signs (Most Recent):  Temp: 99.1 °F (37.3 °C) (24 151)  Pulse: 82 (24 151)  Resp: 16 (24 151)  BP: 137/72 (24 151)  SpO2: 95 % (24) Vital Signs (24h Range):  Temp:  [99.1 °F (37.3 °C)] 99.1 °F (37.3 °C)  Pulse:  [82] 82  Resp:  [16] 16  SpO2:  [95 %] 95 %  BP: (137)/(72) 137/72                                 Physical Exam         Neurosurgery Physical Exam    A/Ox3  RUE 4/5 (prior stroke)  RLE 4 HF/KE, 5 DF/PF  LLE grossly full strength  No guerrero  No clonus  SILT    Significant Labs:  Recent Labs   Lab 24  1648         K 3.4*   *   CO2 20*   BUN 18   CREATININE 0.8   CALCIUM 9.3     Recent Labs   Lab 24  1648   WBC 6.36   HGB 12.3   HCT 38.7        No results for input(s): "LABPT", "INR", "APTT" in the last 48 hours.  Microbiology Results (last 7 days)       ** No results found for the last 168 hours. **          All pertinent labs from the last 24 hours have been reviewed.    Significant Diagnostics:  CT: CT Head Without " Contrast    Result Date: 8/4/2024  1. No acute intracranial abnormalities noting sequela of chronic microvascular ischemic change and senescent change. 2. Focus of suspected encephalomalacia within the left basal ganglia suggesting remote infarct.  No previous examinations are available for comparison, and technically remains age indeterminate.  Correlation with any focal symptomatology attributable to this region recommended.  Further evaluation as warranted. Electronically signed by: William Paige MD Date:    08/04/2024 Time:    19:56   MRI: No results found in the last 24 hours.  I have reviewed all pertinent imaging results/findings within the past 24 hours.

## 2024-08-05 NOTE — HPI
Pt w hx of CVA (residual R sided weakness) presents to hospital w/ back pain. She reports a mechanical fall roughly 1 week ago. Denies neck pain, arm pain / numbness, leg pain / numbness. Denies saddle anesthesia or neurogenic claudication symptoms. Says she can feel when she has to go to the bathroom, no issues with bowel motions, but reports she has increased urinary frequency since the fall. Does not take blood thinners. NSGY was consulted for evaluation of L4 compression fracture.

## 2024-08-05 NOTE — CONSULTS
[facility] - Emergency Dept  Neurosurgery  Consult Note    Inpatient consult to Neurosurgery  Consult performed by: Ingrid Casper MD  Consult ordered by: Zulema Schofield, LUIGI        Subjective:     Chief Complaint/Reason for Admission: L4 compression fracture    History of Present Illness: Pt w hx of CVA (residual R sided weakness) presents to hospital w/ back pain. She reports a mechanical fall roughly 1 week ago. Denies neck pain, arm pain / numbness, leg pain / numbness. Denies saddle anesthesia or neurogenic claudication symptoms. Says she can feel when she has to go to the bathroom, no issues with bowel motions, but reports she has increased urinary frequency since the fall. Does not take blood thinners. NSGY was consulted for evaluation of L4 compression fracture.    (Not in a hospital admission)      Review of patient's allergies indicates:  No Known Allergies    Past Medical History:   Diagnosis Date    Arthritis     Asthma     as child    CVA (cerebral vascular accident)     Hyperlipemia     Hypertension      Past Surgical History:   Procedure Laterality Date     SECTION, CLASSIC       Family History       Problem Relation (Age of Onset)    Heart attack Father    Hypertension Mother    Stroke Mother          Tobacco Use    Smoking status: Some Days     Current packs/day: 0.50     Types: Cigarettes    Smokeless tobacco: Never   Substance and Sexual Activity    Alcohol use: Yes     Comment: occas, none recently    Drug use: Yes     Types: Cocaine     Comment: smoke crack cocaine 4 days PTA    Sexual activity: Yes     Partners: Male     Birth control/protection: None     Review of Systems  Objective:     Weight: 107.5 kg (237 lb)  Body mass index is 39.44 kg/m².  Vital Signs (Most Recent):  Temp: 99.1 °F (37.3 °C) (24)  Pulse: 82 (24 151)  Resp: 16 (24)  BP: 137/72 (24)  SpO2: 95 % (24) Vital Signs (24h Range):  Temp:  [99.1 °F (37.3 °C)] 99.1 °F  "(37.3 °C)  Pulse:  [82] 82  Resp:  [16] 16  SpO2:  [95 %] 95 %  BP: (137)/(72) 137/72                                 Physical Exam         Neurosurgery Physical Exam    A/Ox3  RUE 4/5 (prior stroke)  RLE 4 HF/KE, 5 DF/PF  LLE grossly full strength  No guerrero  No clonus  SILT    Significant Labs:  Recent Labs   Lab 08/04/24  1648         K 3.4*   *   CO2 20*   BUN 18   CREATININE 0.8   CALCIUM 9.3     Recent Labs   Lab 08/04/24  1648   WBC 6.36   HGB 12.3   HCT 38.7        No results for input(s): "LABPT", "INR", "APTT" in the last 48 hours.  Microbiology Results (last 7 days)       ** No results found for the last 168 hours. **          All pertinent labs from the last 24 hours have been reviewed.    Significant Diagnostics:  CT: CT Head Without Contrast    Result Date: 8/4/2024  1. No acute intracranial abnormalities noting sequela of chronic microvascular ischemic change and senescent change. 2. Focus of suspected encephalomalacia within the left basal ganglia suggesting remote infarct.  No previous examinations are available for comparison, and technically remains age indeterminate.  Correlation with any focal symptomatology attributable to this region recommended.  Further evaluation as warranted. Electronically signed by: William Paige MD Date:    08/04/2024 Time:    19:56   MRI: No results found in the last 24 hours.  I have reviewed all pertinent imaging results/findings within the past 24 hours.  Assessment/Plan:     Compression fracture of L4 vertebra  58F w/ hx of prior strokes presents with back pain after a mechanical fall. NSGY consulted for evaluation of L4 compression fracture.    Imaging:  -CTH 8/4: prior L BG stroke, no acute findings   CT c 8/4: no fx or dislocation   MRI L 8/4: moderate canal stenosis   L4-5 CT L 8/4: L4 compression fx , old L3 compression fx    Plan:  -NSGY has seen and evaluated pt at bedside  -All labs and diagnostics reviewed  -Recommend TLSO " brace  -F/u upright / supine XR when wearing TLSO  -NSGY will continue to follow, please call if any changes in exam    Discussed w Dr Herrera        Thank you for your consult. I will follow-up with patient. Please contact us if you have any additional questions.    Ingrid Casper MD  Neurosurgery  [facility] - Emergency Dept

## 2024-08-05 NOTE — ASSESSMENT & PLAN NOTE
58F w/ hx of prior strokes presents with back pain after a mechanical fall. NSGY consulted for evaluation of L4 compression fracture.    Imaging:  -CTH 8/4: prior L BG stroke, no acute findings   CT c 8/4: no fx or dislocation   MRI L 8/4: moderate canal stenosis   L4-5 CT L 8/4: L4 compression fx , old L3 compression fx    Plan:  -NSGY has seen and evaluated pt at bedside  -All labs and diagnostics reviewed  -Recommend TLSO brace  -F/u upright / supine XR when wearing TLSO  -NSGY will continue to follow, please call if any changes in exam    Discussed w Dr Herrera

## 2025-03-16 ENCOUNTER — HOSPITAL ENCOUNTER (OUTPATIENT)
Facility: HOSPITAL | Age: 60
Discharge: HOME | End: 2025-03-19
Attending: STUDENT IN AN ORGANIZED HEALTH CARE EDUCATION/TRAINING PROGRAM
Payer: MEDICAID

## 2025-03-16 DIAGNOSIS — R07.89 CHEST DISCOMFORT: ICD-10-CM

## 2025-03-16 DIAGNOSIS — I24.9 ACS (ACUTE CORONARY SYNDROME): ICD-10-CM

## 2025-03-16 DIAGNOSIS — R91.1 PULMONARY NODULE: ICD-10-CM

## 2025-03-16 DIAGNOSIS — R07.9 CHEST PAIN: Primary | ICD-10-CM

## 2025-03-16 DIAGNOSIS — I50.9 CHF (CONGESTIVE HEART FAILURE): ICD-10-CM

## 2025-03-16 PROBLEM — I10 HTN (HYPERTENSION): Status: ACTIVE | Noted: 2025-03-16

## 2025-03-16 PROBLEM — F19.90 SUBSTANCE USE: Status: ACTIVE | Noted: 2025-03-16

## 2025-03-16 PROBLEM — I63.231 CEREBROVASCULAR ACCIDENT (CVA) DUE TO STENOSIS OF RIGHT CAROTID ARTERY: Status: ACTIVE | Noted: 2025-03-16

## 2025-03-16 PROBLEM — R32 URINARY INCONTINENCE: Status: ACTIVE | Noted: 2025-03-16

## 2025-03-16 LAB
ALBUMIN SERPL BCP-MCNC: 3.6 G/DL (ref 3.5–5.2)
ALP SERPL-CCNC: 111 U/L (ref 40–150)
ALT SERPL W/O P-5'-P-CCNC: 9 U/L (ref 10–44)
ANION GAP SERPL CALC-SCNC: 13 MMOL/L (ref 8–16)
AST SERPL-CCNC: 16 U/L (ref 10–40)
BASOPHILS # BLD AUTO: 0.05 K/UL (ref 0–0.2)
BASOPHILS NFR BLD: 0.7 % (ref 0–1.9)
BILIRUB SERPL-MCNC: 0.6 MG/DL (ref 0.1–1)
BNP SERPL-MCNC: 49 PG/ML (ref 0–99)
BUN SERPL-MCNC: 12 MG/DL (ref 6–20)
CALCIUM SERPL-MCNC: 9.6 MG/DL (ref 8.7–10.5)
CHLORIDE SERPL-SCNC: 109 MMOL/L (ref 95–110)
CHOLEST SERPL-MCNC: 144 MG/DL (ref 120–199)
CHOLEST/HDLC SERPL: 5.3 {RATIO} (ref 2–5)
CO2 SERPL-SCNC: 18 MMOL/L (ref 23–29)
CREAT SERPL-MCNC: 0.8 MG/DL (ref 0.5–1.4)
DIFFERENTIAL METHOD BLD: ABNORMAL
EOSINOPHIL # BLD AUTO: 0.1 K/UL (ref 0–0.5)
EOSINOPHIL NFR BLD: 1.9 % (ref 0–8)
ERYTHROCYTE [DISTWIDTH] IN BLOOD BY AUTOMATED COUNT: 13.2 % (ref 11.5–14.5)
EST. GFR  (NO RACE VARIABLE): >60 ML/MIN/1.73 M^2
ESTIMATED AVG GLUCOSE: 105 MG/DL (ref 68–131)
GLUCOSE SERPL-MCNC: 92 MG/DL (ref 70–110)
GLUCOSE SERPL-MCNC: 94 MG/DL (ref 70–110)
HBA1C MFR BLD: 5.3 % (ref 4–5.6)
HCT VFR BLD AUTO: 41.2 % (ref 37–48.5)
HDLC SERPL-MCNC: 27 MG/DL (ref 40–75)
HDLC SERPL: 18.8 % (ref 20–50)
HGB BLD-MCNC: 13.1 G/DL (ref 12–16)
IMM GRANULOCYTES # BLD AUTO: 0.03 K/UL (ref 0–0.04)
IMM GRANULOCYTES NFR BLD AUTO: 0.4 % (ref 0–0.5)
LDLC SERPL CALC-MCNC: 102 MG/DL (ref 63–159)
LYMPHOCYTES # BLD AUTO: 1.6 K/UL (ref 1–4.8)
LYMPHOCYTES NFR BLD: 22.7 % (ref 18–48)
MAGNESIUM SERPL-MCNC: 1.8 MG/DL (ref 1.6–2.6)
MCH RBC QN AUTO: 27.2 PG (ref 27–31)
MCHC RBC AUTO-ENTMCNC: 31.8 G/DL (ref 32–36)
MCV RBC AUTO: 86 FL (ref 82–98)
MONOCYTES # BLD AUTO: 0.5 K/UL (ref 0.3–1)
MONOCYTES NFR BLD: 7.6 % (ref 4–15)
NEUTROPHILS # BLD AUTO: 4.6 K/UL (ref 1.8–7.7)
NEUTROPHILS NFR BLD: 66.7 % (ref 38–73)
NONHDLC SERPL-MCNC: 117 MG/DL
NRBC BLD-RTO: 0 /100 WBC
PLATELET # BLD AUTO: 247 K/UL (ref 150–450)
PMV BLD AUTO: 9.9 FL (ref 9.2–12.9)
POCT GLUCOSE: 84 MG/DL (ref 70–110)
POTASSIUM SERPL-SCNC: 4.1 MMOL/L (ref 3.5–5.1)
PROT SERPL-MCNC: 8.2 G/DL (ref 6–8.4)
RBC # BLD AUTO: 4.81 M/UL (ref 4–5.4)
SODIUM SERPL-SCNC: 140 MMOL/L (ref 136–145)
TRIGL SERPL-MCNC: 75 MG/DL (ref 30–150)
TROPONIN I SERPL DL<=0.01 NG/ML-MCNC: <3 NG/L (ref 0–14)
WBC # BLD AUTO: 6.88 K/UL (ref 3.9–12.7)

## 2025-03-16 PROCEDURE — 96372 THER/PROPH/DIAG INJ SC/IM: CPT

## 2025-03-16 PROCEDURE — 25000003 PHARM REV CODE 250: Performed by: NURSE PRACTITIONER

## 2025-03-16 PROCEDURE — 83735 ASSAY OF MAGNESIUM: CPT | Performed by: NURSE PRACTITIONER

## 2025-03-16 PROCEDURE — 93010 ELECTROCARDIOGRAM REPORT: CPT | Mod: ,,, | Performed by: INTERNAL MEDICINE

## 2025-03-16 PROCEDURE — 96365 THER/PROPH/DIAG IV INF INIT: CPT

## 2025-03-16 PROCEDURE — 25000003 PHARM REV CODE 250

## 2025-03-16 PROCEDURE — 93005 ELECTROCARDIOGRAM TRACING: CPT

## 2025-03-16 PROCEDURE — 25000242 PHARM REV CODE 250 ALT 637 W/ HCPCS: Performed by: NURSE PRACTITIONER

## 2025-03-16 PROCEDURE — 82962 GLUCOSE BLOOD TEST: CPT

## 2025-03-16 PROCEDURE — 80053 COMPREHEN METABOLIC PANEL: CPT

## 2025-03-16 PROCEDURE — 80061 LIPID PANEL: CPT

## 2025-03-16 PROCEDURE — 25500020 PHARM REV CODE 255: Performed by: STUDENT IN AN ORGANIZED HEALTH CARE EDUCATION/TRAINING PROGRAM

## 2025-03-16 PROCEDURE — 83880 ASSAY OF NATRIURETIC PEPTIDE: CPT

## 2025-03-16 PROCEDURE — G0378 HOSPITAL OBSERVATION PER HR: HCPCS

## 2025-03-16 PROCEDURE — 84484 ASSAY OF TROPONIN QUANT: CPT

## 2025-03-16 PROCEDURE — 96366 THER/PROPH/DIAG IV INF ADDON: CPT

## 2025-03-16 PROCEDURE — 25000242 PHARM REV CODE 250 ALT 637 W/ HCPCS

## 2025-03-16 PROCEDURE — 85025 COMPLETE CBC W/AUTO DIFF WBC: CPT

## 2025-03-16 PROCEDURE — 99285 EMERGENCY DEPT VISIT HI MDM: CPT | Mod: 25

## 2025-03-16 PROCEDURE — 63600175 PHARM REV CODE 636 W HCPCS

## 2025-03-16 PROCEDURE — 84484 ASSAY OF TROPONIN QUANT: CPT | Mod: 91 | Performed by: NURSE PRACTITIONER

## 2025-03-16 PROCEDURE — 83036 HEMOGLOBIN GLYCOSYLATED A1C: CPT

## 2025-03-16 RX ORDER — ATORVASTATIN CALCIUM 40 MG/1
80 TABLET, FILM COATED ORAL DAILY
Status: DISCONTINUED | OUTPATIENT
Start: 2025-03-17 | End: 2025-03-19 | Stop reason: HOSPADM

## 2025-03-16 RX ORDER — GABAPENTIN 600 MG/1
600 TABLET ORAL 3 TIMES DAILY
COMMUNITY

## 2025-03-16 RX ORDER — NALOXONE HCL 0.4 MG/ML
0.02 VIAL (ML) INJECTION
Status: DISCONTINUED | OUTPATIENT
Start: 2025-03-16 | End: 2025-03-19 | Stop reason: HOSPADM

## 2025-03-16 RX ORDER — IPRATROPIUM BROMIDE AND ALBUTEROL SULFATE 2.5; .5 MG/3ML; MG/3ML
3 SOLUTION RESPIRATORY (INHALATION) EVERY 4 HOURS PRN
Status: DISCONTINUED | OUTPATIENT
Start: 2025-03-16 | End: 2025-03-19 | Stop reason: HOSPADM

## 2025-03-16 RX ORDER — LOSARTAN POTASSIUM 50 MG/1
50 TABLET ORAL DAILY
Status: DISCONTINUED | OUTPATIENT
Start: 2025-03-16 | End: 2025-03-16

## 2025-03-16 RX ORDER — HEPARIN SODIUM 5000 [USP'U]/ML
5000 INJECTION, SOLUTION INTRAVENOUS; SUBCUTANEOUS EVERY 8 HOURS
Status: DISCONTINUED | OUTPATIENT
Start: 2025-03-16 | End: 2025-03-19 | Stop reason: HOSPADM

## 2025-03-16 RX ORDER — IBUPROFEN 200 MG
16 TABLET ORAL
Status: DISCONTINUED | OUTPATIENT
Start: 2025-03-16 | End: 2025-03-19 | Stop reason: HOSPADM

## 2025-03-16 RX ORDER — NITROGLYCERIN 0.4 MG/1
0.4 TABLET SUBLINGUAL EVERY 5 MIN PRN
Status: COMPLETED | OUTPATIENT
Start: 2025-03-16 | End: 2025-03-18

## 2025-03-16 RX ORDER — AMLODIPINE BESYLATE 5 MG/1
5 TABLET ORAL DAILY
Status: DISCONTINUED | OUTPATIENT
Start: 2025-03-16 | End: 2025-03-17

## 2025-03-16 RX ORDER — TALC
6 POWDER (GRAM) TOPICAL NIGHTLY PRN
Status: DISCONTINUED | OUTPATIENT
Start: 2025-03-16 | End: 2025-03-19 | Stop reason: HOSPADM

## 2025-03-16 RX ORDER — ONDANSETRON HYDROCHLORIDE 2 MG/ML
4 INJECTION, SOLUTION INTRAVENOUS EVERY 8 HOURS PRN
Status: DISCONTINUED | OUTPATIENT
Start: 2025-03-17 | End: 2025-03-19 | Stop reason: HOSPADM

## 2025-03-16 RX ORDER — NITROGLYCERIN 0.4 MG/1
0.4 TABLET SUBLINGUAL EVERY 5 MIN PRN
Status: COMPLETED | OUTPATIENT
Start: 2025-03-16 | End: 2025-03-16

## 2025-03-16 RX ORDER — AMOXICILLIN 250 MG
1 CAPSULE ORAL 2 TIMES DAILY PRN
Status: DISCONTINUED | OUTPATIENT
Start: 2025-03-16 | End: 2025-03-19 | Stop reason: HOSPADM

## 2025-03-16 RX ORDER — ASPIRIN 325 MG
325 TABLET ORAL
Status: COMPLETED | OUTPATIENT
Start: 2025-03-16 | End: 2025-03-16

## 2025-03-16 RX ORDER — LOSARTAN POTASSIUM 50 MG/1
100 TABLET ORAL DAILY
Status: DISCONTINUED | OUTPATIENT
Start: 2025-03-17 | End: 2025-03-19 | Stop reason: HOSPADM

## 2025-03-16 RX ORDER — IBUPROFEN 200 MG
1 TABLET ORAL DAILY
Status: DISCONTINUED | OUTPATIENT
Start: 2025-03-17 | End: 2025-03-19 | Stop reason: HOSPADM

## 2025-03-16 RX ORDER — GABAPENTIN 300 MG/1
600 CAPSULE ORAL 3 TIMES DAILY
Status: DISCONTINUED | OUTPATIENT
Start: 2025-03-17 | End: 2025-03-19 | Stop reason: HOSPADM

## 2025-03-16 RX ORDER — HYDRALAZINE HYDROCHLORIDE 25 MG/1
25 TABLET, FILM COATED ORAL ONCE
Status: DISCONTINUED | OUTPATIENT
Start: 2025-03-17 | End: 2025-03-17

## 2025-03-16 RX ORDER — ACETAMINOPHEN 325 MG/1
650 TABLET ORAL EVERY 8 HOURS PRN
Status: DISCONTINUED | OUTPATIENT
Start: 2025-03-16 | End: 2025-03-19 | Stop reason: HOSPADM

## 2025-03-16 RX ORDER — BISACODYL 10 MG/1
10 SUPPOSITORY RECTAL DAILY PRN
Status: DISCONTINUED | OUTPATIENT
Start: 2025-03-16 | End: 2025-03-19 | Stop reason: HOSPADM

## 2025-03-16 RX ORDER — ALUMINUM HYDROXIDE, MAGNESIUM HYDROXIDE, AND SIMETHICONE 1200; 120; 1200 MG/30ML; MG/30ML; MG/30ML
30 SUSPENSION ORAL 4 TIMES DAILY PRN
Status: DISCONTINUED | OUTPATIENT
Start: 2025-03-16 | End: 2025-03-19 | Stop reason: HOSPADM

## 2025-03-16 RX ORDER — OXYCODONE AND ACETAMINOPHEN 10; 325 MG/1; MG/1
1 TABLET ORAL EVERY 4 HOURS PRN
Refills: 0 | Status: DISCONTINUED | OUTPATIENT
Start: 2025-03-17 | End: 2025-03-19 | Stop reason: HOSPADM

## 2025-03-16 RX ORDER — DIPHENHYDRAMINE HCL 25 MG
25 CAPSULE ORAL
Status: COMPLETED | OUTPATIENT
Start: 2025-03-16 | End: 2025-03-16

## 2025-03-16 RX ORDER — INSULIN ASPART 100 [IU]/ML
0-10 INJECTION, SOLUTION INTRAVENOUS; SUBCUTANEOUS
Status: DISCONTINUED | OUTPATIENT
Start: 2025-03-16 | End: 2025-03-19 | Stop reason: HOSPADM

## 2025-03-16 RX ORDER — MAGNESIUM SULFATE HEPTAHYDRATE 40 MG/ML
2 INJECTION, SOLUTION INTRAVENOUS ONCE
Status: COMPLETED | OUTPATIENT
Start: 2025-03-17 | End: 2025-03-17

## 2025-03-16 RX ORDER — PROCHLORPERAZINE EDISYLATE 5 MG/ML
5 INJECTION INTRAMUSCULAR; INTRAVENOUS EVERY 6 HOURS PRN
Status: DISCONTINUED | OUTPATIENT
Start: 2025-03-16 | End: 2025-03-19 | Stop reason: HOSPADM

## 2025-03-16 RX ORDER — AMLODIPINE BESYLATE 5 MG/1
5 TABLET ORAL DAILY
Status: ON HOLD | COMMUNITY
Start: 2025-01-11 | End: 2025-03-19 | Stop reason: HOSPADM

## 2025-03-16 RX ORDER — METHOCARBAMOL 500 MG/1
500 TABLET, FILM COATED ORAL ONCE
Status: COMPLETED | OUTPATIENT
Start: 2025-03-17 | End: 2025-03-17

## 2025-03-16 RX ORDER — IBUPROFEN 200 MG
24 TABLET ORAL
Status: DISCONTINUED | OUTPATIENT
Start: 2025-03-16 | End: 2025-03-19 | Stop reason: HOSPADM

## 2025-03-16 RX ORDER — NAPROXEN SODIUM 220 MG/1
81 TABLET, FILM COATED ORAL DAILY
Status: DISCONTINUED | OUTPATIENT
Start: 2025-03-17 | End: 2025-03-19 | Stop reason: HOSPADM

## 2025-03-16 RX ORDER — ACETAMINOPHEN 500 MG
1000 TABLET ORAL ONCE
Status: DISCONTINUED | OUTPATIENT
Start: 2025-03-17 | End: 2025-03-16

## 2025-03-16 RX ORDER — GLUCAGON 1 MG
1 KIT INJECTION
Status: DISCONTINUED | OUTPATIENT
Start: 2025-03-16 | End: 2025-03-19 | Stop reason: HOSPADM

## 2025-03-16 RX ADMIN — NITROGLYCERIN 0.4 MG: 0.4 TABLET, ORALLY DISINTEGRATING SUBLINGUAL at 09:03

## 2025-03-16 RX ADMIN — DIPHENHYDRAMINE HYDROCHLORIDE 25 MG: 25 CAPSULE ORAL at 11:03

## 2025-03-16 RX ADMIN — NITROGLYCERIN 0.4 MG: 0.4 TABLET, ORALLY DISINTEGRATING SUBLINGUAL at 11:03

## 2025-03-16 RX ADMIN — LOSARTAN POTASSIUM 50 MG: 50 TABLET, FILM COATED ORAL at 08:03

## 2025-03-16 RX ADMIN — HEPARIN SODIUM 5000 UNITS: 5000 INJECTION INTRAVENOUS; SUBCUTANEOUS at 09:03

## 2025-03-16 RX ADMIN — TICAGRELOR 90 MG: 90 TABLET ORAL at 09:03

## 2025-03-16 RX ADMIN — IOHEXOL 100 ML: 350 INJECTION, SOLUTION INTRAVENOUS at 11:03

## 2025-03-16 RX ADMIN — AMLODIPINE BESYLATE 5 MG: 5 TABLET ORAL at 10:03

## 2025-03-16 RX ADMIN — ASPIRIN 325 MG ORAL TABLET 325 MG: 325 PILL ORAL at 11:03

## 2025-03-16 NOTE — HPI
60 yo female with Pmhx of COPD, HTN, CVA s/p R carotid stent placed 02/2025 on brilinta/plavix/statin who presents to the ED for acute midsternal chest pain.    Chest pain started at rest at 8pm on 03/16 mostly mid to left sternal with some radiation to the back and up neck but none to the arm. Accompanied by shortness of breath and diaphoresis. Patient went to bed and woke up in the middle of the night with pain still present so she came to the ED. Pain improved after being given NG in the ED.    Prior to this episode patient has never had chest pain or shortness of breath before, either at rest or with exertion. Yesterday patient was at the [employer]. She smoked a vape and had half  daiquiri. Patient smokes cocaine every couple of days but last had cocaine 3 days ago. Patient is unclear what kind of medication she takes except for a BP pill and aspirin.     Social hx:  Lives: with children, uses motorozied wheelchair 2/2 to residual weakness from prior CVA   Works: retired  ETOH: some  Tobacco: 1/2 pack daily  Drugs: cocaine, every couple of days        In the ED: trops negative x2. EKG with no acute ischemic changes. CTA negative for PE. Mildy hypertensive. Heart score of  4.

## 2025-03-16 NOTE — ED PROVIDER NOTES
Encounter Date: 3/16/2025       History     Chief Complaint   Patient presents with    Shortness of Breath     HPI  Tamiko Mercado is a 59 y.o. female, PMH COPD, current smoker, HTN,  presenting with complaints of CP with radiation to the R arm and SOB. Pt reports onset of pain since 8pm last night and decided to come in today when her pain didn't resolve. She reports mid-sternal CP. She reports non-adherence with home BP medications and missed doses of blood thinners. She denies abdominal pain, back pain, vision changes, numbness/tingling. She is joined at bedside by her daughter who helps to provide history. Daughter reports prior to last night she was in her normal state of health and they went to the [employer] yesterday together.     Pt has hx of previous R ICA occulusion with stenting of R carotid approximately one month ago.    Review of patient's allergies indicates:  No Known Allergies  Past Medical History:   Diagnosis Date    Arthritis     Asthma     as child    CVA (cerebral vascular accident)     Hyperlipemia     Hypertension      Past Surgical History:   Procedure Laterality Date     SECTION, CLASSIC       Family History   Problem Relation Name Age of Onset    Stroke Mother      Hypertension Mother      Heart attack Father       Social History[1]  Review of Systems    Physical Exam     Initial Vitals [25 1009]   BP Pulse Resp Temp SpO2   (!) 172/91 83 20 98.6 °F (37 °C) 96 %      MAP       --         Physical Exam    Nursing note and vitals reviewed.  Constitutional: She appears well-developed and well-nourished. She is not diaphoretic. She is active and cooperative.  Non-toxic appearance. She does not appear ill. She appears distressed.   HENT:   Head: Normocephalic.   Eyes: Conjunctivae and EOM are normal. No scleral icterus.   Cardiovascular:  Normal rate, regular rhythm and intact distal pulses.  No extrasystoles are present.    Exam reveals no gallop, no S3, no S4, no distant heart  sounds and no friction rub.       No murmur heard.  Pulmonary/Chest: Breath sounds normal. No accessory muscle usage. No tachypnea. No respiratory distress. She has no decreased breath sounds. She has no wheezes. She has no rhonchi. She has no rales.   Abdominal: Abdomen is soft and protuberant. She exhibits no shifting dullness, no distension and no fluid wave. There is no abdominal tenderness. There is no rebound and no guarding.     Neurological: She is alert. She is not disoriented. GCS score is 15. GCS eye subscore is 4. GCS verbal subscore is 5. GCS motor subscore is 6.   Skin: Skin is warm and dry.   Psychiatric: She has a normal mood and affect. Her behavior is normal. Judgment and thought content normal.         ED Course   Procedures  Labs Reviewed   CBC W/ AUTO DIFFERENTIAL - Abnormal       Result Value    WBC 6.88      RBC 4.81      Hemoglobin 13.1      Hematocrit 41.2      MCV 86      MCH 27.2      MCHC 31.8 (*)     RDW 13.2      Platelets 247      MPV 9.9      Immature Granulocytes 0.4      Gran # (ANC) 4.6      Immature Grans (Abs) 0.03      Lymph # 1.6      Mono # 0.5      Eos # 0.1      Baso # 0.05      nRBC 0      Gran % 66.7      Lymph % 22.7      Mono % 7.6      Eosinophil % 1.9      Basophil % 0.7      Differential Method Automated     COMPREHENSIVE METABOLIC PANEL - Abnormal    Sodium 140      Potassium 4.1      Chloride 109      CO2 18 (*)     Glucose 92      BUN 12      Creatinine 0.8      Calcium 9.6      Total Protein 8.2      Albumin 3.6      Total Bilirubin 0.6      Alkaline Phosphatase 111      AST 16      ALT 9 (*)     eGFR >60.0      Anion Gap 13     TROPONIN I HIGH SENSITIVITY    Troponin I High Sensitivity <3     TROPONIN I HIGH SENSITIVITY    Troponin I High Sensitivity <3     B-TYPE NATRIURETIC PEPTIDE    BNP 49       EKG Readings: (Independently Interpreted)   Initial Reading: No STEMI.   Rate 76. Mild ST changes in II, v4-v6 but not meeting STEMI criteria. Symmetric T waves in II        Imaging Results              CTA Chest Non-Coronary (PE Studies) (Final result)  Result time 03/16/25 13:02:24      Final result by Gianluca Chapa MD (03/16/25 13:02:24)                   Impression:      1. No convincing evidence of pulmonary thromboembolism through the segmental levels, allowing for limitations.  Further evaluation/follow-up as warranted.  2. Basilar predominant nonspecific pulmonary mosaic attenuation which can be seen with small vessels disease including pulmonary edema or small airways process.  No consolidation or pleural effusion.  3. Coronary and systemic atherosclerosis.  Suspected left ventricular hypertrophy.  4. Right lower lobe 2 mm solid pulmonary nodule.  For a solid nodule <6 mm, Fleischner Society 2017 guidelines recommend no routine follow up for a low risk patient, or follow-up with non-contrast chest CT at 12 months in a high risk patient.  5. Other incidental/nonemergent findings in the body of the report.      Electronically signed by: Gianluca Chapa MD  Date:    03/16/2025  Time:    13:02               Narrative:    EXAMINATION:  CTA CHEST NON CORONARY (PE STUDIES)    CLINICAL HISTORY:  Pulmonary embolism (PE) suspected, unknown D-dimer;    TECHNIQUE:  Low dose axial images, sagittal and coronal reformations with axial MIPS were obtained from the thoracic inlet to the lung bases following the IV administration of 75 mL of Omnipaque 350, per PE protocol.    COMPARISON:  Chest radiograph same date, cervical and lumbar spine CT 08/04/2024, CT renal stone study 11/13/2007; report only from outside facility CTA cardiac 02/03/2025    FINDINGS:  Study is somewhat limited by beam hardening with streak artifact from overlying monitoring leads and contrast bolus in the SVC as well as respiratory motion particularly at the lung bases.    Base of Neck: No significant abnormality.    Thoracic soft tissues: Within normal limits.    Aorta: Left-sided 4 vessel aortic arch.  Mildly  tortuous with scattered calcified and noncalcified atherosclerotic plaque.  No aortic aneurysm or dissection seen.    Heart: Normal in overall size, noting suspected left ventricular hypertrophy.  No effusion.  Multi-vessel coronary arterial calcifications with aortic annular calcifications noted.  No cardiac thrombus seen.    Pulmonary vasculature: Pulmonary arteries distribute normally.  There are four pulmonary veins.  Pulmonary trunk is within normal limits.  No saddle embolus.  No convincing evidence of pulmonary thromboembolism through the segmental levels.    Annika/Mediastinum: No pathologic cami enlargement.    Airways: Patent.    Lungs/Pleura: Well expanded.  Mild dependent atelectasis.  Scattered linear opacities consistent with platelike scarring versus atelectasis more so at the right lung base.  Mild patchy nonspecific pulmonary mosaic attenuation with a mostly centrilobular and basilar distribution.  No consolidation.  2 mm solid nodule within the posterolateral right lower lobe (series 3, image 240).  No pneumothorax.  No pleural effusion or thickening.    Esophagus: Within normal limits.    Upper Abdomen: No acute/significant abnormality of the partially imaged upper abdomen.    Bones: Age-related minimal degenerative change.  No acute fracture. No suspicious lytic or sclerotic lesions.                                       X-Ray Chest AP Portable (Final result)  Result time 03/16/25 11:26:42      Final result by Felicita Renteria MD (03/16/25 11:26:42)                   Impression:      No acute cardiopulmonary disease      Electronically signed by: Felicita Renteria  Date:    03/16/2025  Time:    11:26               Narrative:    EXAMINATION:  XR CHEST AP PORTABLE    CLINICAL HISTORY:  Chest Pain;    TECHNIQUE:  Single frontal view of the chest was performed.    COMPARISON:  08/29/2019    FINDINGS:  The lungs are clear.  No pleural effusion.  Heart size is within normal limits.                                        Medications   nitroGLYCERIN SL tablet 0.4 mg (0.4 mg Sublingual Given 3/16/25 1147)   iohexoL (OMNIPAQUE 350) injection 100 mL (has no administration in time range)   aspirin tablet 325 mg (325 mg Oral Given 3/16/25 1109)   iohexoL (OMNIPAQUE 350) injection 100 mL (100 mLs Intravenous Given 3/16/25 1137)   diphenhydrAMINE capsule 25 mg (25 mg Oral Given 3/16/25 1154)     Medical Decision Making  Tamiko Mercado  is a 59 y.o. female, presenting with complaints of Chest pain with SOB, history of present illness obtained from pt and daughter at bedside as seen above. Patient able to provide adequate and detailed  history of present illness and tolerated physical exam well. Patient found to be in moderate distress secondary to pain , stable. Exam notable as above.     DDX includes but is not limited to: ACS, Unstable angina, NSTEMI, COPD exacerbation, PE.   CT-PE without evidence of PE. Pt had rapid improvement of her symptoms with nitro, concerning for ACS vs unstable angina. EKG without ST elevations that meet stemi criteria today. She does have nonspecific changes with ST elevations. No wheezing or respiratory distress and moving air in all fields; unlikely to be COPD exacerbation. Pt give ASA on arrival. Pt with high risk presentation and very suspicious for NSTEMI. We will admit for further provocative testing.     HEART Score for Major Cardiac Events - MDCalcCalculated on Mar 16 2025 4:03 PM7 points -> High Score (7-10 points) Risk of MACE of 50-65%.    See ED course for additional discussion.    Pt does have incidental finding of pulmonary nodule that would not explain today's presentation and can follow up with PCP for this.     Data Reviewed/Counseling: I have reviewed the patient's vital signs, nursing notes, and other relevant tests/information. Any incidental findings were discussed with the patient. I had a detailed discussion with the patient regarding the historical points, exam  findings, and any diagnostic results supporting the diagnosis. Discussed case with , who agreed to evaluate and admit patient.  Disposition: Admitted     Amount and/or Complexity of Data Reviewed  External Data Reviewed: radiology.     Details: No prior Echo for baseline  Labs: ordered. Decision-making details documented in ED Course.  Radiology: ordered.    Risk  OTC drugs.  Prescription drug management.  Decision regarding hospitalization.               ED Course as of 03/16/25 1605   Sun Mar 16, 2025   1015 No STEMI on EKG [NN]   1125 BP(!): 172/91 [HB]   1126 BP(!): 168/96 [HB]   1126 WBC: 6.88 [HB]   1126 Hemoglobin: 13.1 [HB]   1126 Hematocrit: 41.2 [HB]   1126 Dramatic improvement in pain after nitro with improvement in BP also to 140s systolic   [HB]   1140 WBC: 6.88 [HB]   1140 Hemoglobin: 13.1 [HB]   1140 Hematocrit: 41.2 [HB]   1140 Troponin I High Sensitivity: <3  wnl [HB]   1324 BNP: 49 [HB]   1421 Troponin I High Sensitivity: <3  wnl [HB]      ED Course User Index  [HB] Lisa Dominguez MD  [NN] Eleanor Granda MD                           Clinical Impression:  Final diagnoses:  [R07.89] Chest discomfort  [R07.9] Chest pain  [R91.1] Pulmonary nodule (Primary)          ED Disposition Condition    Observation Stable                    [1]   Social History  Tobacco Use    Smoking status: Some Days     Current packs/day: 0.50     Types: Cigarettes    Smokeless tobacco: Never   Substance Use Topics    Alcohol use: Yes     Comment: occas, none recently    Drug use: Yes     Types: Cocaine     Comment: smoke crack cocaine 4 days PTA        Lisa Dominguez MD  Resident  03/16/25 160

## 2025-03-16 NOTE — ED TRIAGE NOTES
Pt arriving to ED c/o SOB and mid sternal chest pain radiating to R arm started last night, stent placed to R neck last month.

## 2025-03-16 NOTE — Clinical Note
Diagnosis: Chest pain [386870]   Future Attending Provider: LUCIANO GARCIA [[id number]]   Is the patient being sent to ED Observation?: No

## 2025-03-17 ENCOUNTER — DOCUMENTATION ONLY (OUTPATIENT)
Dept: CARDIOLOGY | Facility: CLINIC | Age: 60
End: 2025-03-17
Payer: MEDICAID

## 2025-03-17 PROBLEM — E87.6 HYPOKALEMIA: Status: ACTIVE | Noted: 2025-03-17

## 2025-03-17 PROBLEM — Z71.89 ACP (ADVANCE CARE PLANNING): Status: ACTIVE | Noted: 2025-03-17

## 2025-03-17 LAB
ALBUMIN SERPL BCP-MCNC: 3.1 G/DL (ref 3.5–5.2)
ALP SERPL-CCNC: 102 U/L (ref 40–150)
ALT SERPL W/O P-5'-P-CCNC: 8 U/L (ref 10–44)
ANION GAP SERPL CALC-SCNC: 10 MMOL/L (ref 8–16)
ANION GAP SERPL CALC-SCNC: 9 MMOL/L (ref 8–16)
ASCENDING AORTA: 2.69 CM
AST SERPL-CCNC: 11 U/L (ref 10–40)
AV AREA BY CONTINUOUS VTI: 2.9 CM2
AV INDEX (PROSTH): 1
AV LVOT MEAN GRADIENT: 8 MMHG
AV LVOT PEAK GRADIENT: 9 MMHG
AV MEAN GRADIENT: 7 MMHG
AV PEAK GRADIENT: 12 MMHG
AV VALVE AREA BY VELOCITY RATIO: 2.5 CM²
AV VALVE AREA: 2.8 CM2
AV VELOCITY RATIO: 0.88
BASOPHILS # BLD AUTO: 0.02 K/UL (ref 0–0.2)
BASOPHILS NFR BLD: 0.2 % (ref 0–1.9)
BILIRUB SERPL-MCNC: 0.4 MG/DL (ref 0.1–1)
BSA FOR ECHO PROCEDURE: 2.23 M2
BUN SERPL-MCNC: 13 MG/DL (ref 6–20)
BUN SERPL-MCNC: 14 MG/DL (ref 6–20)
CALCIUM SERPL-MCNC: 8.7 MG/DL (ref 8.7–10.5)
CALCIUM SERPL-MCNC: 9 MG/DL (ref 8.7–10.5)
CHLORIDE SERPL-SCNC: 109 MMOL/L (ref 95–110)
CHLORIDE SERPL-SCNC: 110 MMOL/L (ref 95–110)
CO2 SERPL-SCNC: 22 MMOL/L (ref 23–29)
CO2 SERPL-SCNC: 23 MMOL/L (ref 23–29)
CREAT SERPL-MCNC: 0.7 MG/DL (ref 0.5–1.4)
CREAT SERPL-MCNC: 0.7 MG/DL (ref 0.5–1.4)
CV ECHO LV RWT: 0.44 CM
DIFFERENTIAL METHOD BLD: ABNORMAL
DOP CALC AO PEAK VEL: 1.7 M/S
DOP CALC AO VTI: 30.3 CM
DOP CALC LVOT AREA: 2.8 CM2
DOP CALC LVOT DIAMETER: 1.9 CM
DOP CALC LVOT PEAK VEL: 1.5 M/S
DOP CALC LVOT STROKE VOLUME: 86.1 CM3
DOP CALCLVOT PEAK VEL VTI: 30.4 CM
E WAVE DECELERATION TIME: 307 MS
E/A RATIO: 1.09
E/E' RATIO: 8 M/S
ECHO EF ESTIMATED: 76 %
ECHO LV POSTERIOR WALL: 1 CM (ref 0.6–1.1)
EOSINOPHIL # BLD AUTO: 0.1 K/UL (ref 0–0.5)
EOSINOPHIL NFR BLD: 1.6 % (ref 0–8)
ERYTHROCYTE [DISTWIDTH] IN BLOOD BY AUTOMATED COUNT: 13.1 % (ref 11.5–14.5)
EST. GFR  (NO RACE VARIABLE): >60 ML/MIN/1.73 M^2
EST. GFR  (NO RACE VARIABLE): >60 ML/MIN/1.73 M^2
FRACTIONAL SHORTENING: 44.4 % (ref 28–44)
GLUCOSE SERPL-MCNC: 80 MG/DL (ref 70–110)
GLUCOSE SERPL-MCNC: 86 MG/DL (ref 70–110)
HCT VFR BLD AUTO: 32.9 % (ref 37–48.5)
HGB BLD-MCNC: 10.8 G/DL (ref 12–16)
IMM GRANULOCYTES # BLD AUTO: 0.02 K/UL (ref 0–0.04)
IMM GRANULOCYTES NFR BLD AUTO: 0.2 % (ref 0–0.5)
INTERVENTRICULAR SEPTUM: 1.1 CM (ref 0.6–1.1)
IVRT: 140 MS
LA MAJOR: 5.5 CM
LA MINOR: 5.5 CM
LA WIDTH: 4.5 CM
LEFT ATRIUM SIZE: 3.7 CM
LEFT ATRIUM VOLUME INDEX MOD: 32 ML/M2
LEFT ATRIUM VOLUME INDEX: 36 ML/M2
LEFT ATRIUM VOLUME MOD: 68 ML
LEFT ATRIUM VOLUME: 78 CM3
LEFT INTERNAL DIMENSION IN SYSTOLE: 2.5 CM (ref 2.1–4)
LEFT VENTRICLE DIASTOLIC VOLUME INDEX: 43.46 ML/M2
LEFT VENTRICLE DIASTOLIC VOLUME: 93 ML
LEFT VENTRICLE MASS INDEX: 76.6 G/M2
LEFT VENTRICLE SYSTOLIC VOLUME INDEX: 10.7 ML/M2
LEFT VENTRICLE SYSTOLIC VOLUME: 23 ML
LEFT VENTRICULAR INTERNAL DIMENSION IN DIASTOLE: 4.5 CM (ref 3.5–6)
LEFT VENTRICULAR MASS: 164 G
LV LATERAL E/E' RATIO: 6.7
LV SEPTAL E/E' RATIO: 10.6
LYMPHOCYTES # BLD AUTO: 1.4 K/UL (ref 1–4.8)
LYMPHOCYTES NFR BLD: 17.4 % (ref 18–48)
MAGNESIUM SERPL-MCNC: 2.4 MG/DL (ref 1.6–2.6)
MCH RBC QN AUTO: 27.1 PG (ref 27–31)
MCHC RBC AUTO-ENTMCNC: 32.8 G/DL (ref 32–36)
MCV RBC AUTO: 83 FL (ref 82–98)
MONOCYTES # BLD AUTO: 0.6 K/UL (ref 0.3–1)
MONOCYTES NFR BLD: 7 % (ref 4–15)
MV PEAK A VEL: 0.68 M/S
MV PEAK E VEL: 0.74 M/S
NEUTROPHILS # BLD AUTO: 6 K/UL (ref 1.8–7.7)
NEUTROPHILS NFR BLD: 73.6 % (ref 38–73)
NRBC BLD-RTO: 0 /100 WBC
OHS CV RV/LV RATIO: 0.69 CM
OHS QRS DURATION: 96 MS
OHS QRS DURATION: 96 MS
OHS QRS DURATION: 98 MS
OHS QTC CALCULATION: 466 MS
OHS QTC CALCULATION: 488 MS
OHS QTC CALCULATION: 533 MS
PHOSPHATE SERPL-MCNC: 2.5 MG/DL (ref 2.7–4.5)
PISA TR MAX VEL: 2.5 M/S
PLATELET # BLD AUTO: 263 K/UL (ref 150–450)
PMV BLD AUTO: 10.4 FL (ref 9.2–12.9)
POCT GLUCOSE: 101 MG/DL (ref 70–110)
POCT GLUCOSE: 119 MG/DL (ref 70–110)
POCT GLUCOSE: 205 MG/DL (ref 70–110)
POCT GLUCOSE: 84 MG/DL (ref 70–110)
POCT GLUCOSE: 99 MG/DL (ref 70–110)
POTASSIUM SERPL-SCNC: 2.9 MMOL/L (ref 3.5–5.1)
POTASSIUM SERPL-SCNC: 3 MMOL/L (ref 3.5–5.1)
PROT SERPL-MCNC: 6.9 G/DL (ref 6–8.4)
RA MAJOR: 4.81 CM
RA PRESSURE ESTIMATED: 3 MMHG
RA WIDTH: 3.36 CM
RBC # BLD AUTO: 3.99 M/UL (ref 4–5.4)
RIGHT VENTRICLE DIASTOLIC BASEL DIMENSION: 3.1 CM
RV TB RVSP: 6 MMHG
RV TISSUE DOPPLER FREE WALL SYSTOLIC VELOCITY 1 (APICAL 4 CHAMBER VIEW): 16.41 CM/S
SINUS: 2.89 CM
SODIUM SERPL-SCNC: 141 MMOL/L (ref 136–145)
SODIUM SERPL-SCNC: 142 MMOL/L (ref 136–145)
STJ: 2.51 CM
TDI LATERAL: 0.11 M/S
TDI SEPTAL: 0.07 M/S
TDI: 0.09 M/S
TRICUSPID ANNULAR PLANE SYSTOLIC EXCURSION: 2.58 CM
TV PEAK GRADIENT: 25 MMHG
TV REST PULMONARY ARTERY PRESSURE: 28 MMHG
WBC # BLD AUTO: 8.2 K/UL (ref 3.9–12.7)
Z-SCORE OF LEFT VENTRICULAR DIMENSION IN END DIASTOLE: -4.25
Z-SCORE OF LEFT VENTRICULAR DIMENSION IN END SYSTOLE: -4.09

## 2025-03-17 PROCEDURE — 25000003 PHARM REV CODE 250: Performed by: STUDENT IN AN ORGANIZED HEALTH CARE EDUCATION/TRAINING PROGRAM

## 2025-03-17 PROCEDURE — 25000003 PHARM REV CODE 250

## 2025-03-17 PROCEDURE — 25000003 PHARM REV CODE 250: Performed by: NURSE PRACTITIONER

## 2025-03-17 PROCEDURE — 36415 COLL VENOUS BLD VENIPUNCTURE: CPT

## 2025-03-17 PROCEDURE — 83735 ASSAY OF MAGNESIUM: CPT

## 2025-03-17 PROCEDURE — 85025 COMPLETE CBC W/AUTO DIFF WBC: CPT

## 2025-03-17 PROCEDURE — 36415 COLL VENOUS BLD VENIPUNCTURE: CPT | Performed by: STUDENT IN AN ORGANIZED HEALTH CARE EDUCATION/TRAINING PROGRAM

## 2025-03-17 PROCEDURE — 63600175 PHARM REV CODE 636 W HCPCS

## 2025-03-17 PROCEDURE — G0378 HOSPITAL OBSERVATION PER HR: HCPCS

## 2025-03-17 PROCEDURE — 80048 BASIC METABOLIC PNL TOTAL CA: CPT | Performed by: STUDENT IN AN ORGANIZED HEALTH CARE EDUCATION/TRAINING PROGRAM

## 2025-03-17 PROCEDURE — 96372 THER/PROPH/DIAG INJ SC/IM: CPT

## 2025-03-17 PROCEDURE — 63600175 PHARM REV CODE 636 W HCPCS: Performed by: STUDENT IN AN ORGANIZED HEALTH CARE EDUCATION/TRAINING PROGRAM

## 2025-03-17 PROCEDURE — 84100 ASSAY OF PHOSPHORUS: CPT

## 2025-03-17 PROCEDURE — 80053 COMPREHEN METABOLIC PANEL: CPT

## 2025-03-17 PROCEDURE — 63600175 PHARM REV CODE 636 W HCPCS: Performed by: NURSE PRACTITIONER

## 2025-03-17 PROCEDURE — S4991 NICOTINE PATCH NONLEGEND: HCPCS

## 2025-03-17 RX ORDER — POTASSIUM CHLORIDE 7.45 MG/ML
10 INJECTION INTRAVENOUS
Status: DISPENSED | OUTPATIENT
Start: 2025-03-17 | End: 2025-03-17

## 2025-03-17 RX ORDER — POTASSIUM CHLORIDE 7.45 MG/ML
10 INJECTION INTRAVENOUS
Status: ACTIVE | OUTPATIENT
Start: 2025-03-17 | End: 2025-03-17

## 2025-03-17 RX ORDER — POTASSIUM CHLORIDE 20 MEQ/1
40 TABLET, EXTENDED RELEASE ORAL ONCE
Status: COMPLETED | OUTPATIENT
Start: 2025-03-17 | End: 2025-03-17

## 2025-03-17 RX ORDER — PANTOPRAZOLE SODIUM 40 MG/1
40 TABLET, DELAYED RELEASE ORAL DAILY
Status: DISCONTINUED | OUTPATIENT
Start: 2025-03-17 | End: 2025-03-19 | Stop reason: HOSPADM

## 2025-03-17 RX ORDER — HYDRALAZINE HYDROCHLORIDE 25 MG/1
25 TABLET, FILM COATED ORAL EVERY 8 HOURS PRN
Status: DISCONTINUED | OUTPATIENT
Start: 2025-03-17 | End: 2025-03-19 | Stop reason: HOSPADM

## 2025-03-17 RX ORDER — HYDRALAZINE HYDROCHLORIDE 25 MG/1
50 TABLET, FILM COATED ORAL ONCE
Status: COMPLETED | OUTPATIENT
Start: 2025-03-17 | End: 2025-03-17

## 2025-03-17 RX ORDER — PANTOPRAZOLE SODIUM 40 MG/1
40 TABLET, DELAYED RELEASE ORAL DAILY
Status: DISCONTINUED | OUTPATIENT
Start: 2025-03-17 | End: 2025-03-17

## 2025-03-17 RX ORDER — POTASSIUM CHLORIDE 20 MEQ/1
20 TABLET, EXTENDED RELEASE ORAL ONCE
Status: COMPLETED | OUTPATIENT
Start: 2025-03-17 | End: 2025-03-17

## 2025-03-17 RX ORDER — AMLODIPINE BESYLATE 10 MG/1
10 TABLET ORAL DAILY
Status: DISCONTINUED | OUTPATIENT
Start: 2025-03-17 | End: 2025-03-19 | Stop reason: HOSPADM

## 2025-03-17 RX ADMIN — TICAGRELOR 90 MG: 90 TABLET ORAL at 09:03

## 2025-03-17 RX ADMIN — PANTOPRAZOLE SODIUM 40 MG: 40 TABLET, DELAYED RELEASE ORAL at 03:03

## 2025-03-17 RX ADMIN — ATORVASTATIN CALCIUM 80 MG: 40 TABLET, FILM COATED ORAL at 08:03

## 2025-03-17 RX ADMIN — METHOCARBAMOL 500 MG: 500 TABLET ORAL at 12:03

## 2025-03-17 RX ADMIN — Medication 1 PATCH: at 08:03

## 2025-03-17 RX ADMIN — ALUMINUM HYDROXIDE, MAGNESIUM HYDROXIDE, AND SIMETHICONE 30 ML: 200; 200; 20 SUSPENSION ORAL at 02:03

## 2025-03-17 RX ADMIN — MAGNESIUM SULFATE HEPTAHYDRATE 2 G: 40 INJECTION, SOLUTION INTRAVENOUS at 12:03

## 2025-03-17 RX ADMIN — HYDRALAZINE HYDROCHLORIDE 50 MG: 25 TABLET ORAL at 12:03

## 2025-03-17 RX ADMIN — HEPARIN SODIUM 5000 UNITS: 5000 INJECTION INTRAVENOUS; SUBCUTANEOUS at 09:03

## 2025-03-17 RX ADMIN — ASPIRIN 81 MG CHEWABLE TABLET 81 MG: 81 TABLET CHEWABLE at 08:03

## 2025-03-17 RX ADMIN — HEPARIN SODIUM 5000 UNITS: 5000 INJECTION INTRAVENOUS; SUBCUTANEOUS at 06:03

## 2025-03-17 RX ADMIN — LOSARTAN POTASSIUM 100 MG: 50 TABLET, FILM COATED ORAL at 08:03

## 2025-03-17 RX ADMIN — GABAPENTIN 600 MG: 300 CAPSULE ORAL at 08:03

## 2025-03-17 RX ADMIN — TICAGRELOR 90 MG: 90 TABLET ORAL at 08:03

## 2025-03-17 RX ADMIN — HEPARIN SODIUM 5000 UNITS: 5000 INJECTION INTRAVENOUS; SUBCUTANEOUS at 02:03

## 2025-03-17 RX ADMIN — GABAPENTIN 600 MG: 300 CAPSULE ORAL at 09:03

## 2025-03-17 RX ADMIN — Medication 6 MG: at 02:03

## 2025-03-17 RX ADMIN — POTASSIUM CHLORIDE 20 MEQ: 1500 TABLET, EXTENDED RELEASE ORAL at 09:03

## 2025-03-17 RX ADMIN — POTASSIUM CHLORIDE 40 MEQ: 1500 TABLET, EXTENDED RELEASE ORAL at 11:03

## 2025-03-17 RX ADMIN — OXYCODONE AND ACETAMINOPHEN 1 TABLET: 325; 10 TABLET ORAL at 03:03

## 2025-03-17 RX ADMIN — GABAPENTIN 600 MG: 300 CAPSULE ORAL at 12:03

## 2025-03-17 RX ADMIN — GABAPENTIN 600 MG: 300 CAPSULE ORAL at 03:03

## 2025-03-17 RX ADMIN — AMLODIPINE BESYLATE 10 MG: 10 TABLET ORAL at 08:03

## 2025-03-17 NOTE — NURSING
Peripheral IV Access Attempts: 3rd Attempt    Attempt successful: No    Escalated to the: PIV/Midline/PICC team

## 2025-03-17 NOTE — PROGRESS NOTES
Pt arrived to OBS, room 704 via stretcher and transport escort. Daughter is bedside as well. Pt has c/o of pain. Assessment completed. Pt is on telemetry showing NSR. Bed is locked, in lowest position, call light in reach, and side rail up x2. Pt updated on plan of care. Pt instructed to use call light for assistance when needed. Will continue plan of care.      03/17/25 0210   Vital Signs   Temp 98.6 °F (37 °C)   Temp Source Oral   Pulse 83   Heart Rate Source Monitor   Resp 16   SpO2 97 %   Device (Oxygen Therapy) room air   BP (!) 159/81   MAP (mmHg) 113   BP Location Right arm   BP Method Automatic   Patient Position Lying

## 2025-03-17 NOTE — ASSESSMENT & PLAN NOTE
Patient's blood pressure range in the last 24 hours was: BP  Min: 152/74  Max: 177/84.The patient's inpatient anti-hypertensive regimen is listed below:  Current Antihypertensives  nitroGLYCERIN SL tablet 0.4 mg, Every 5 min PRN, Sublingual    Plan  - BP is uncontrolled, will adjust as follows: will restart home losartan, unclear compliance

## 2025-03-17 NOTE — ASSESSMENT & PLAN NOTE
Started at rest. Mid sternal. Started 8pm on 03/15. Resolved with nitroglycerin. No prior Hx of chest pain at rest or with exertion.     - s/p NG x1 in ED   - trops negative x2  - EKG with no ischemic changes.     Plan;   - TTE ordered  - dobutamine stress test in the AM, NPO at midnight   - tele  - PRN nitroglycerin   - UDS   - Risk start: A1c 5.3%, lipid panel pending. Hx of AS disease with CVA. Hx of recent Cocaine use.

## 2025-03-17 NOTE — ASSESSMENT & PLAN NOTE
Episode of urinary incontinence during chest pain. Denies dysuria.     - UA ordered  - will monitor for recurrence

## 2025-03-17 NOTE — PLAN OF CARE
Initial Discharge Planning Assessment:  Patient admitted on: 3/16/25     Chart reviewed, Care plan discussed with treatment team,  attending Dr. Santiago      PCP updated in Epic: Patient do not have a PCP  Pharmacy, updated in Epic: Bedside      DME at home: [facility]       Current dispo: Home with family       Transportation: Family can provide      Power of  or Living Will: Daughter      Anticipated DC needs from CM perspective:            [facility] - Emergency Dept  Initial Discharge Assessment       Primary Care Provider: No, Primary Doctor    Admission Diagnosis: Chest pain [R07.9]    Admission Date: 3/16/2025  Expected Discharge Date:     Transition of Care Barriers: None    Payor: MEDICAID / Plan: [facility] COMMUNITY PLAN [facility] ([place] MEDICAID) / Product Type: Managed Medicaid /     Extended Emergency Contact Information  Primary Emergency Contact: Bhumi Mercado  Mobile Phone: [phone]  Relation: Daughter  Preferred language: English   needed? No  Secondary Emergency Contact: Yeny Mercado   [place]  Mobile Phone: [phone]  Relation: Daughter    Discharge Plan A: Home with family  Discharge Plan B: Home Health, Home with family      [id number] [address] [facility] [address]  [address] [address]  Phone: [phone] Fax: [phone]      Initial Assessment (most recent)       Adult Discharge Assessment - 03/16/25 7366          Discharge Assessment    Assessment Type Discharge Planning Assessment     Confirmed/corrected address, phone number and insurance Yes     Confirmed Demographics Correct on Facesheet     Source of Information patient;family;health record     People in Home child(mary), adult     Do you expect to return to your current living situation? Yes     Do you have help at home or someone to help you manage your care at home? Yes     Prior to hospitilization cognitive status:  Alert/Oriented     Current cognitive status: Alert/Oriented     Walking or Climbing Stairs Difficulty yes     Walking or Climbing Stairs ambulation difficulty, requires equipment     Dressing/Bathing Difficulty yes     Dressing/Bathing bathing difficulty, requires equipment     Equipment Currently Used at Home walker, rolling;cane, straight;bedside commode     Readmission within 30 days? Yes (P)    [facility], discharge date is unknown    Patient currently being followed by outpatient case management? No     Do you currently have service(s) that help you manage your care at home? No     Do you take prescription medications? Yes     Do you have prescription coverage? Yes     Do you have any problems affording any of your prescribed medications? No (P)      Is the patient taking medications as prescribed? yes (P)      How do you get to doctors appointments? family or friend will provide     Are you on dialysis? No     Do you take coumadin? No     Discharge Plan A Home with family     Discharge Plan B Home Health;Home with family     DME Needed Upon Discharge  none     Discharge Plan discussed with: Patient     Transition of Care Barriers None        Physical Activity    On average, how many days per week do you engage in moderate to strenuous exercise (like a brisk walk)? 0 days (P)      On average, how many minutes do you engage in exercise at this level? 0 min (P)         Financial Resource Strain    How hard is it for you to pay for the very basics like food, housing, medical care, and heating? Not hard at all (P)         Housing Stability    In the last 12 months, was there a time when you were not able to pay the mortgage or rent on time? No (P)      At any time in the past 12 months, were you homeless or living in a shelter (including now)? No (P)         Transportation Needs    Has the lack of transportation kept you from medical appointments, meetings, work or from getting things needed for daily  living? No (P)         Food Insecurity    Within the past 12 months, you worried that your food would run out before you got the money to buy more. Never true (P)      Within the past 12 months, the food you bought just didn't last and you didn't have money to get more. Never true (P)         Stress    Do you feel stress - tense, restless, nervous, or anxious, or unable to sleep at night because your mind is troubled all the time - these days? Not at all (P)         Social Isolation    How often do you feel lonely or isolated from those around you?  Never (P)         Alcohol Use    Q1: How often do you have a drink containing alcohol? Never (P)      Q2: How many drinks containing alcohol do you have on a typical day when you are drinking? Patient does not drink (P)      Q3: How often do you have six or more drinks on one occasion? Never (P)         Utilities    In the past 12 months has the electric, gas, oil, or water company threatened to shut off services in your home? No (P)         Health Literacy    How often do you need to have someone help you when you read instructions, pamphlets, or other written material from your doctor or pharmacy? Never (P)

## 2025-03-17 NOTE — PROGRESS NOTES
[facility] ([facility]) Team notified of pt referral via Direct notification via epic in basket message, secure chat message or other . PT from [employer]    Patient screened today 3-17-25 by provider and RN for enrollment to program.      Pt was deemed not a candidate for enrollment at this time related to patient current admission diagnosis/ problem will not benefit from the Heart Failure Transitional Care Program. No HF Dx, Echo nml, Diastolic Fx nml, No diuresis.    Pt will require additional follow up planning per primary team.     If pt status, diagnosis, or treatment plan changes , please place AMB referral to [facility] ([id number]) for H[facility] enrollment re-evalution.

## 2025-03-17 NOTE — PROGRESS NOTES
[facility] - Observation [address] [place] [address] Medicine  Progress Note    Patient Name: Tamiko Mercado  MRN: [id number]  Patient Class: OP- Observation   Admission Date: 3/16/2025  Length of Stay: 0 days  Attending Physician: Torin Rosales DO  Primary Care Provider: Jes, Primary Doctor        Subjective     Principal Problem:Chest pain        HPI:  60 yo female with Pmhx of COPD, HTN, CVA s/p R carotid stent placed 02/2025 on brilinta/plavix/statin who presents to the ED for acute midsternal chest pain.    Chest pain started at rest at 8pm on 03/16 mostly mid to left sternal with some radiation to the back and up neck but none to the arm. Accompanied by shortness of breath and diaphoresis. Patient went to bed and woke up in the middle of the night with pain still present so she came to the ED. Pain improved after being given NG in the ED.    Prior to this episode patient has never had chest pain or shortness of breath before, either at rest or with exertion. Yesterday patient was at the [employer]. She smoked a vape and had half  daiquiri. Patient smokes cocaine every couple of days but last had cocaine 3 days ago. Patient is unclear what kind of medication she takes except for a BP pill and aspirin.     Social hx:  Lives: with children, uses motorozied wheelchair 2/2 to residual weakness from prior CVA   Works: retired  ETOH: some  Tobacco: 1/2 pack daily  Drugs: cocaine, every couple of days        In the ED: trops negative x2. EKG with no acute ischemic changes. CTA negative for PE. Mildy hypertensive. Heart score of  4.     Overview/Hospital Course:  Admitted with the chest pain, calculated HEART score 4. Trops negative x2. CTA negative for PE. Likely in the setting of cocaine use and also uncontrolled HTN. Stress test has been ordered however given hypokalemia not able to perform stress today. Will replace K and reschedule for tomorrow    Interval History: Seen this AM at bedside.  No acute events reported overnight.  Endorsed substernal chest pain overnight, denies current. Deneis dyspnea    Review of Systems  Objective:     Vital Signs (Most Recent):  Temp: 98.1 °F (36.7 °C) (03/17/25 1148)  Pulse: 66 (03/17/25 1148)  Resp: 16 (03/17/25 1148)  BP: (!) 148/71 (03/17/25 1148)  SpO2: 97 % (03/17/25 1148) Vital Signs (24h Range):  Temp:  [98.1 °F (36.7 °C)-98.7 °F (37.1 °C)] 98.1 °F (36.7 °C)  Pulse:  [66-96] 66  Resp:  [16-22] 16  SpO2:  [95 %-100 %] 97 %  BP: (134-213)/() 148/71     Weight: 108.9 kg (240 lb)  Body mass index is 39.94 kg/m².    Intake/Output Summary (Last 24 hours) at 3/17/2025 1302  Last data filed at 3/17/2025 0304  Gross per 24 hour   Intake 40 ml   Output --   Net 40 ml         Physical Exam  Constitutional:       General: She is not in acute distress.     Appearance: Normal appearance.   HENT:      Head: Normocephalic and atraumatic.   Eyes:      Pupils: Pupils are equal, round, and reactive to light.   Cardiovascular:      Rate and Rhythm: Normal rate and regular rhythm.      Pulses: Normal pulses.   Pulmonary:      Effort: Pulmonary effort is normal. No respiratory distress.      Breath sounds: Normal breath sounds. No wheezing.   Abdominal:      General: Abdomen is flat. Bowel sounds are normal. There is no distension.      Palpations: Abdomen is soft.      Tenderness: There is no abdominal tenderness.   Musculoskeletal:         General: Normal range of motion.      Cervical back: Normal range of motion.      Comments: Chest pain not reproducible   Skin:     General: Skin is warm and dry.      Capillary Refill: Capillary refill takes less than 2 seconds.   Neurological:      General: No focal deficit present.      Mental Status: She is alert. Mental status is at baseline.   Psychiatric:         Mood and Affect: Mood normal.         Thought Content: Thought content normal.               Significant Labs: All pertinent labs within the past 24 hours have been reviewed.    Significant Imaging: I have  reviewed all pertinent imaging results/findings within the past 24 hours.      Assessment & Plan  Chest pain  Started at rest. Mid sternal. Started 8pm on 03/15. Resolved with nitroglycerin. No prior Hx of chest pain at rest or with exertion. Cocaine use  - s/p NG x1 in ED   - trops negative x2  - EKG with no ischemic changes.   - TTE ordered, wnl  Plan:  - stress TTE rescheduled to rw given hypokalemia  - tele  - PRN nitroglycerin     HTN (hypertension)  Patient's blood pressure range in the last 24 hours was: BP  Min: 134/76  Max: 213/93.The patient's inpatient anti-hypertensive regimen is listed below:  Current Antihypertensives  nitroGLYCERIN SL tablet 0.4 mg, Every 5 min PRN, Sublingual  , Daily, Oral  losartan tablet 100 mg, Daily, Oral  amLODIPine tablet 10 mg, Daily, Oral  hydrALAZINE tablet 25 mg, Every 8 hours PRN, Oral    Plan  - BP is uncontrolled, will adjust as follows: will restart home medications, unclear compliance   -Hydralazine prn    Cerebrovascular accident (CVA) due to stenosis of right carotid artery  Hx of left sided thalamic stroke with residual right sided deficits. Had more recent  right carotid stenosis induced stroke in 02/2025 with a carotid stent place 02/07/2025.     - cont ASA + brilinta (unclear home compliance)  - cont atorvastatin       Antithrombotics for secondary stroke prevention: Antiplatelets: Aspirin: 81 mg daily + Brilinta BID     Statins for secondary stroke prevention and hyperlipidemia, if present:   Statins: Atorvastatin- 40 mg daily    Aggressive risk factor modification: HTN, Smoking, HLD, Exercise, CAD     Rehab efforts: The patient has been evaluated by a stroke team provider and the therapy needs have been fully considered based off the presenting complaints and exam findings. The following therapy evaluations are needed: PT evaluate and treat    Diagnostics ordered/pending: Other: patient's work up has already been done at [facility]     VTE prophylaxis: Heparin  5000 units SQ every 8 hours    BP parameters:  BP < 140/90 goal, no strict BP parameters       Substance use  Tobacco use 1/2 pack day. Cocaine use Several times a week.     - nicotine patch         Urinary incontinence  Episode of urinary incontinence during chest pain. Denies dysuria.     - UA ordered  - will monitor for recurrence     ACP (advance care planning)  Discussed with patient code status on admit. Patient would prefer to be DNR.     Hypokalemia  Patient's most recent potassium results are listed below.   Recent Labs     03/16/25  1110 03/17/25  0506 03/17/25  1142   K 4.1 2.9* 3.0*     Plan  - Replete potassium per protocol  - Monitor potassium Daily  - Patient's hypokalemia is stable  VTE Risk Mitigation (From admission, onward)           Ordered     heparin (porcine) injection 5,000 Units  Every 8 hours         03/16/25 1642     IP VTE HIGH RISK PATIENT  Once         03/16/25 1642     Place sequential compression device  Until discontinued         03/16/25 1642                    Discharge Planning   SALINAS: 3/18/2025     Code Status: DNR   Medical Readiness for Discharge Date:   Discharge Plan A: Home with family                        Torin Rosales DO  Department of Hospital Medicine   [facility] - Observation 11H

## 2025-03-17 NOTE — ASSESSMENT & PLAN NOTE
Started at rest. Mid sternal. Started 8pm on 03/15. Resolved with nitroglycerin. No prior Hx of chest pain at rest or with exertion. Cocaine use  - s/p NG x1 in ED   - trops negative x2  - EKG with no ischemic changes.   - TTE ordered, wnl  Plan:  - stress TTE rescheduled to tmrw given hypokalemia  - tele  - PRN nitroglycerin

## 2025-03-17 NOTE — ASSESSMENT & PLAN NOTE
Patient's blood pressure range in the last 24 hours was: BP  Min: 134/76  Max: 213/93.The patient's inpatient anti-hypertensive regimen is listed below:  Current Antihypertensives  nitroGLYCERIN SL tablet 0.4 mg, Every 5 min PRN, Sublingual  , Daily, Oral  losartan tablet 100 mg, Daily, Oral  amLODIPine tablet 10 mg, Daily, Oral  hydrALAZINE tablet 25 mg, Every 8 hours PRN, Oral    Plan  - BP is uncontrolled, will adjust as follows: will restart home medications, unclear compliance   -Hydralazine prn

## 2025-03-17 NOTE — H&P
[facility] - Emergency Dept  [facility] Medicine  History & Physical    Patient Name: Tamiko Mercado  MRN: [id number]  Patient Class: OP- Observation  Admission Date: 3/16/2025  Attending Physician: Diana Saucedo MD   Primary Care Provider: Jes, Primary Doctor         Patient information was obtained from patient, relative(s), and ER records.     Subjective:     Principal Problem:Chest pain    Chief Complaint:   Chief Complaint   Patient presents with    Shortness of Breath        HPI: 58 yo female with Pmhx of COPD, HTN, CVA s/p R carotid stent placed 2025 on brilinta/plavix/statin who presents to the ED for acute midsternal chest pain.    Chest pain started at rest at 8pm on  mostly mid to left sternal with some radiation to the back and up neck but none to the arm. Accompanied by shortness of breath and diaphoresis. Patient went to bed and woke up in the middle of the night with pain still present so she came to the ED. Pain improved after being given NG in the ED.    Prior to this episode patient has never had chest pain or shortness of breath before, either at rest or with exertion. Yesterday patient was at the [employer]. She smoked a vape and had half  daiquiri. Patient smokes cocaine every couple of days but last had cocaine 3 days ago. Patient is unclear what kind of medication she takes except for a BP pill and aspirin.     Social hx:  Lives: with children, uses motorozied wheelchair 2/2 to residual weakness from prior CVA   Works: retired  ETOH: some  Tobacco: 1/2 pack daily  Drugs: cocaine, every couple of days        In the ED: trops negative x2. EKG with no acute ischemic changes. CTA negative for PE. Mildy hypertensive. Heart score of  4.     Past Medical History:   Diagnosis Date    Arthritis     Asthma     as child    CVA (cerebral vascular accident)     Hyperlipemia     Hypertension        Past Surgical History:   Procedure Laterality Date     SECTION, CLASSIC         Review of  patient's allergies indicates:  No Known Allergies    No current facility-administered medications on file prior to encounter.     Current Outpatient Medications on File Prior to Encounter   Medication Sig    AMLODIPINE BESYLATE/BENAZEPRIL (LOTREL ORAL) Take by mouth. States blue pill, does not know dose.    butalbital-acetaminophen-caffeine -40 mg (FIORICET, ESGIC) -40 mg per tablet Take 1-2 tablets by mouth every 6 (six) hours as needed for Headaches.    carisoprodol (SOMA) 350 MG tablet Take 350 mg by mouth 4 (four) times daily as needed for Muscle spasms.    esomeprazole (NEXIUM) 40 MG capsule Take 1 capsule (40 mg total) by mouth once daily.    omeprazole (PRILOSEC) 40 MG capsule Take 40 mg by mouth once daily.    oxycodone-acetaminophen (PERCOCET)  mg per tablet Take 1 tablet by mouth every 4 (four) hours as needed for Pain.     Family History       Problem Relation (Age of Onset)    Heart attack Father    Hypertension Mother    Stroke Mother          Tobacco Use    Smoking status: Some Days     Current packs/day: 0.50     Types: Cigarettes    Smokeless tobacco: Never   Substance and Sexual Activity    Alcohol use: Yes     Comment: occas, none recently    Drug use: Yes     Types: Cocaine     Comment: smoke crack cocaine 4 days PTA    Sexual activity: Yes     Partners: Male     Birth control/protection: None     Review of Systems   Constitutional:  Positive for diaphoresis. Negative for activity change, appetite change, chills, fatigue and fever.   Respiratory:  Positive for chest tightness and shortness of breath. Negative for cough and wheezing.    Cardiovascular:  Positive for chest pain. Negative for palpitations and leg swelling.   Gastrointestinal:  Negative for abdominal pain, constipation, diarrhea, nausea and vomiting.   Genitourinary:  Positive for frequency.   Neurological:  Negative for dizziness, syncope and numbness.     Objective:     Vital Signs (Most Recent):  Temp: 98.6 °F  (37 °C) (03/16/25 1009)  Pulse: 71 (03/16/25 1734)  Resp: 16 (03/16/25 1734)  BP: (!) 162/80 (03/16/25 1734)  SpO2: 98 % (03/16/25 1734) Vital Signs (24h Range):  Temp:  [98.6 °F (37 °C)] 98.6 °F (37 °C)  Pulse:  [69-83] 71  Resp:  [16-22] 16  SpO2:  [95 %-98 %] 98 %  BP: (152-177)/(74-96) 162/80     Weight: 108.9 kg (240 lb)  Body mass index is 39.94 kg/m².     Physical Exam      Physical Exam  Gen: in NAD, appears stated age  Neuro: AAOx4, CN2-12 grossly intact BL; weakness in RUE and RLE. Particularly stiff hand.   HEENT: NTNC, EOMI, PERRLA, MMM; almost edentulous   CVS: RRR, no m/r/g; S1/S2 auscultated with no S3 or S4; capillary refill < 2 sec  Resp: lungs CTAB, no w/r/r; no belabored breathing or accessory muscle use appreciated   Abd: BS+ in all 4 quadrants; NTND, soft to palpation; no organomegaly appreciated   Extrem: pulses full, equal, and regular over all 4 extremities; no UE or LE edema BL       Significant Labs: All pertinent labs within the past 24 hours have been reviewed.    Significant Imaging: I have reviewed all pertinent imaging results/findings within the past 24 hours.  Assessment/Plan:     * Chest pain  Started at rest. Mid sternal. Started 8pm on 03/15. Resolved with nitroglycerin. No prior Hx of chest pain at rest or with exertion.     - s/p NG x1 in ED   - trops negative x2  - EKG with no ischemic changes.     Plan;   - TTE ordered  - dobutamine stress test in the AM, NPO at midnight   - tele  - PRN nitroglycerin   - UDS   - Risk start: A1c 5.3%, lipid panel pending. Hx of AS disease with CVA. Hx of recent Cocaine use.       Cerebrovascular accident (CVA) due to stenosis of right carotid artery  Hx of left sided thalamic stroke with residual right sided deficits. Had more recent  right carotid stenosis induced stroke in 02/2025 with a carotid stent place 02/07/2025.     - cont ASA + brilinta (unclear home compliance)  - cont atorvastatin       Antithrombotics for secondary stroke  prevention: Antiplatelets: Aspirin: 81 mg daily + Brilinta BID     Statins for secondary stroke prevention and hyperlipidemia, if present:   Statins: Atorvastatin- 40 mg daily    Aggressive risk factor modification: HTN, Smoking, HLD, Exercise, CAD     Rehab efforts: The patient has been evaluated by a stroke team provider and the therapy needs have been fully considered based off the presenting complaints and exam findings. The following therapy evaluations are needed: PT evaluate and treat    Diagnostics ordered/pending: Other: patient's work up has already been done at [facility]     VTE prophylaxis: Heparin 5000 units SQ every 8 hours    BP parameters:  BP < 140/90 goal, no strict BP parameters         HTN (hypertension)  Patient's blood pressure range in the last 24 hours was: BP  Min: 152/74  Max: 177/84.The patient's inpatient anti-hypertensive regimen is listed below:  Current Antihypertensives  nitroGLYCERIN SL tablet 0.4 mg, Every 5 min PRN, Sublingual    Plan  - BP is uncontrolled, will adjust as follows: will restart home losartan, unclear compliance       Urinary incontinence  Episode of urinary incontinence during chest pain. Denies dysuria.     - UA ordered  - will monitor for recurrence       Substance use  Tobacco use 1/2 pack day. Cocaine use Several times a week.     - nicotine patch             VTE Risk Mitigation (From admission, onward)           Ordered     heparin (porcine) injection 5,000 Units  Every 8 hours         03/16/25 1642     IP VTE HIGH RISK PATIENT  Once         03/16/25 1642     Place sequential compression device  Until discontinued         03/16/25 1642                       On 03/16/2025, patient should be placed in hospital observation services under my care.             Diana Saucedo MD  Department of Hospital Medicine  [facility] - Emergency Dept

## 2025-03-17 NOTE — ASSESSMENT & PLAN NOTE
Hx of left sided thalamic stroke with residual right sided deficits. Had more recent  right carotid stenosis induced stroke in 02/2025 with a carotid stent place 02/07/2025.     - cont ASA + brilinta (unclear home compliance)  - cont atorvastatin       Antithrombotics for secondary stroke prevention: Antiplatelets: Aspirin: 81 mg daily + Brilinta BID     Statins for secondary stroke prevention and hyperlipidemia, if present:   Statins: Atorvastatin- 40 mg daily    Aggressive risk factor modification: HTN, Smoking, HLD, Exercise, CAD     Rehab efforts: The patient has been evaluated by a stroke team provider and the therapy needs have been fully considered based off the presenting complaints and exam findings. The following therapy evaluations are needed: PT evaluate and treat    Diagnostics ordered/pending: Other: patient's work up has already been done at [facility]     VTE prophylaxis: Heparin 5000 units SQ every 8 hours    BP parameters: BP < 140/90 goal, no strict BP parameters

## 2025-03-17 NOTE — NURSING
[facility]  Anesthesia Pre-Operative Evaluation         Patient Name: Se Cook  YOB: 2023  MRN: [id number]    SUBJECTIVE:     Pre-operative evaluation for Procedure(s) (LRB):  REVISION, PROCEDURE INVOLVING VENTRICULOPERITONEAL SHUNT, ENDOSCOPIC (Left)     2022    Se Cook is a 2 m.o. female w/ IVH with development of post-hemorrhagic hydrocephalus. Premature at 27 weeks in October. Extubated on NC.   Status post subgaleal shunt from 2/2-2/13 and 2/13 - 3/17. Status post  shunt   placement on 3/17. CUS on 3/31 with increasing ventriculomegaly and progressive cystic encephalomalacia    Patient now presents for the above procedure(s).    Peds Echo, 3/18  Follow-up for history of PDA and PFO:.  Patent foramen ovale.  Left to right atrial shunt, small.  PDA that is large at the aortic end, but narrows discretely to 1.3 mm at the PA end.  Continuous left to right PDA shunt, Ao-PA shunt with peak gradient of 50 mmHg  Mild left atrial enlargement.  Normal right and left ventricle structure and size.  Normal right and left ventricular systolic function.  Normal size aorta.  No evidence of coarctation of the aorta.  No pericardial effusion.     LDA:       NG/OG Tube 03/22/22 1330 nasogastric 5 Fr. Left nostril (Active)   Placement Check placement verified by distal tube length measurement 04/06/22 1100   Distal Tube Length (cm) 20 04/06/22 1100   Tolerance no signs/symptoms of discomfort 04/06/22 1100   Securement secured to cheek 04/06/22 1100   Insertion Site Appearance no redness, warmth, tenderness, skin breakdown, drainage 04/06/22 1100   Feeding Type bolus;by pump 04/06/22 1100   Formula Name SSC 25 04/06/22 0800   Intake (mL) - Formula Tube Feeding 48 04/06/22 1100   Length Of Feeding (Min) 45 04/06/22 1100   Number of days: 15       Prev airway: Placement Date: 03/17/22; Placement Time: 1152 (created via procedure documentation); Method of Intubation:  IV Potassium y sited to NS patient c/o excruciating pain and stated to stop the infusion and ask the doctor if she can have pills instead of iv K md notified no new orders given. Will continue to monitor for the remainder of my shift.   Direct laryngoscopy; Mask Ventilation: Easy; Intubated: Postinduction; Blade: Other (see comments) (Alvarez 00); Airway Device Size: 3.0; Placement Verified By: Capnometry; Complicating Factors: None;    Drips: None documented.   dextrose 5 % and 0.2 % NaCl 13 mL/hr at 22 0000    tpn  formula C         Patient Active Problem List   Diagnosis    Prematurity, 750-999 grams, 27-28 completed weeks    VLBW baby (very low birth-weight baby)     anemia    Apnea of prematurity     IVH (intraventricular hemorrhage), grade IV    Periventricular hemorrhagic venous infarct    Post-hemorrhagic hydrocephalus    Chronic lung disease in     PDA (patent ductus arteriosus)    ROP (retinopathy of prematurity), stage 2, bilateral    Intraventricular hemorrhage of , grade II       Review of patient's allergies indicates:  No Known Allergies    Current Inpatient Medications:   chlorothiazide  15 mg/kg Per G Tube BID    gentamicin 10mg/mL injection for intrathecal use  5 mg Intrathecal Once    gentamicin 10mg/mL injection for intrathecal use  5 mg Intrathecal Once    pediatric multivitamin with iron  1 mL Oral Daily    vancomycin 20 mg/mL injection for intrathecal use  20 mg Intrathecal Once    vancomycin 20 mg/mL injection for intrathecal use  20 mg Intrathecal Once       No current facility-administered medications on file prior to encounter.     No current outpatient medications on file prior to encounter.       Past Surgical History:   Procedure Laterality Date    ENDOSCOPIC INSERTION OF VENTRICULOPERITONEAL SHUNT Left 2022    Procedure: INSERTION, SHUNT, VENTRICULOPERITONEAL, ENDOSCOPIC;  Surgeon: Shonna Real MD;  Location: [facility] OR;  Service: Neurosurgery;  Laterality: Left;    HARDWARE REMOVAL Right 2022    Procedure: REMOVAL, HARDWARE;  Surgeon: Shonna Real MD;  Location: [facility] OR;  Service: Neurosurgery;  Laterality: Right;  subgaleal shunt     INSERTION OF SUBGALEAL SHUNT Right 2022    Procedure: INSERTION, SHUNT, SUBGALEAL;  Surgeon: Shonna Real MD;  Location: [facility] OR;  Service: Neurosurgery;  Laterality: Right;    REPLACEMENT OF VENTRICULAR SHUNT Right 2022    Procedure: REPLACEMENT, SHUNT, VENTRICULAR;  Surgeon: Shonna Real MD;  Location: [facility] OR;  Service: Neurosurgery;  Laterality: Right;       Social History     Socioeconomic History    Marital status: Single       OBJECTIVE:     Vital Signs Range (Last 24H):  Temp:  [36.7 °C (98 °F)-37.2 °C (99 °F)]   Pulse:  [145-183]   Resp:  [29-97]   BP: ()/(41)   SpO2:  [89 %-97 %]       Significant Labs:  Lab Results   Component Value Date    WBC 30.46 (H) 2022    HGB 10.0 2022    HCT 35.8 2022     2022    TRIG 85 2022    ALT 19 2022    AST 37 2022     2022    K 5.0 2022     2022    CREATININE 0.3 (L) 2022    BUN 9 2022    CO2 27 2022       Diagnostic Studies: No relevant studies.    EKG:   No results found for this or any previous visit.    2D ECHO:  TTE:  No results found for this or any previous visit.    ANISH:  No results found for this or any previous visit.    ASSESSMENT/PLAN:           Pre-op Assessment    I have reviewed the Patient Summary Reports.     I have reviewed the Nursing Notes. I have reviewed the NPO Status.   I have reviewed the Medications.     Review of Systems  Anesthesia Hx:  No previous Anesthesia Denies Hx of Anesthetic complications  Neg history of prior surgery. Denies Family Hx of Anesthesia complications.   Denies Personal Hx of Anesthesia complications.   Hematology/Oncology:  Hematology Normal   Oncology Normal    -- Anemia:    EENT/Dental:  EENT/Dental Normal ROP   Cardiovascular:  Cardiovascular Normal  Denies Valvular problems/Murmurs.     Pulmonary:  Pulmonary Normal  Denies Asthma.  Denies Recent URI. Apnea of prematurity   RDS   Renal/:  Renal/  Normal     Hepatic/GI:  Hepatic/GI Normal    Musculoskeletal:  Musculoskeletal Normal    Neurological:   CVA Denies Seizures. Grade IV IVH, hydrocephalus   Endocrine:  Endocrine Normal    Psych:  Psychiatric Normal           Physical Exam  General: Well nourished, Cooperative, Alert and Oriented    Airway:  Mouth Opening: Normal  TM Distance: Normal  Tongue: Normal  Neck ROM: Normal ROM    Dental:  Intact    Chest/Lungs:  Clear to auscultation, Normal Respiratory Rate    Heart:  Rate: Normal  Rhythm: Regular Rhythm  Sounds: Normal    Abdomen:  Normal        Anesthesia Plan  Type of Anesthesia, risks & benefits discussed:    Anesthesia Type: Gen ETT  Intra-op Monitoring Plan: Standard ASA Monitors  Post Op Pain Control Plan: multimodal analgesia  Airway Plan: Direct  Informed Consent: Informed consent signed with the Patient representative and all parties understand the risks and agree with anesthesia plan.  All questions answered.   ASA Score: 4  Day of Surgery Review of History & Physical: H&P Update referred to the surgeon/provider.    Ready For Surgery From Anesthesia Perspective.     .

## 2025-03-17 NOTE — NURSING
Patient c/o burning pain in throat to chest rates pain as 8 on a scale of 1-10 dr barber notified vss prn maalax given and md added ppi. Will continue to monitor.

## 2025-03-17 NOTE — PLAN OF CARE
Patient aao x's 4. Patient complained of burning and tenderness to piv, iv was removed, unable to replace iv patient was a hardd stick md gave orders for picc team to place piv. Echo notified that patient did not have iv access and was not given K riders they postponed ECHO until K is replaced. Dr. Rosales made aware, md stated he will reschedule K riders  at a later time after patient gets new iv. Diet advanced. No current c/o chest pain after prn maalox oxycodone and scheduled protonix given. Picc team nurse placed 20g piv it flushes well patient denies tenderness to site, Dr Rosales notified. Daughter is at bedside. Safety precautions maintained call light in reach and plan of care is ongoing.             Problem: Adult Inpatient Plan of Care  Goal: Plan of Care Review  Outcome: Progressing  Goal: Patient-Specific Goal (Individualized)  Outcome: Progressing  Goal: Absence of Hospital-Acquired Illness or Injury  Outcome: Progressing  Goal: Optimal Comfort and Wellbeing  Outcome: Progressing  Goal: Readiness for Transition of Care  Outcome: Progressing     Problem: Infection  Goal: Absence of Infection Signs and Symptoms  Outcome: Progressing     Problem: Skin Injury Risk Increased  Goal: Skin Health and Integrity  Outcome: Progressing     Problem: Fall Injury Risk  Goal: Absence of Fall and Fall-Related Injury  Outcome: Progressing

## 2025-03-17 NOTE — ASSESSMENT & PLAN NOTE
Patient's most recent potassium results are listed below.   Recent Labs     03/16/25  1110 03/17/25  0506 03/17/25  1142   K 4.1 2.9* 3.0*     Plan  - Replete potassium per protocol  - Monitor potassium Daily  - Patient's hypokalemia is stable

## 2025-03-17 NOTE — CONSULTS
[facility] VASCULAR ACCESS NOTE       Bed:704/704 A    20G x 1.75IN [facility].    Indication: PVA  Attempts: 1    BRITTNEY KAYE RN

## 2025-03-17 NOTE — SUBJECTIVE & OBJECTIVE
Interval History: Seen this AM at bedside.  No acute events reported overnight. Endorsed substernal chest pain overnight, denies current. Deneis dyspnea    Review of Systems  Objective:     Vital Signs (Most Recent):  Temp: 98.1 °F (36.7 °C) (03/17/25 1148)  Pulse: 66 (03/17/25 1148)  Resp: 16 (03/17/25 1148)  BP: (!) 148/71 (03/17/25 1148)  SpO2: 97 % (03/17/25 1148) Vital Signs (24h Range):  Temp:  [98.1 °F (36.7 °C)-98.7 °F (37.1 °C)] 98.1 °F (36.7 °C)  Pulse:  [66-96] 66  Resp:  [16-22] 16  SpO2:  [95 %-100 %] 97 %  BP: (134-213)/() 148/71     Weight: 108.9 kg (240 lb)  Body mass index is 39.94 kg/m².    Intake/Output Summary (Last 24 hours) at 3/17/2025 1302  Last data filed at 3/17/2025 0304  Gross per 24 hour   Intake 40 ml   Output --   Net 40 ml         Physical Exam  Constitutional:       General: She is not in acute distress.     Appearance: Normal appearance.   HENT:      Head: Normocephalic and atraumatic.   Eyes:      Pupils: Pupils are equal, round, and reactive to light.   Cardiovascular:      Rate and Rhythm: Normal rate and regular rhythm.      Pulses: Normal pulses.   Pulmonary:      Effort: Pulmonary effort is normal. No respiratory distress.      Breath sounds: Normal breath sounds. No wheezing.   Abdominal:      General: Abdomen is flat. Bowel sounds are normal. There is no distension.      Palpations: Abdomen is soft.      Tenderness: There is no abdominal tenderness.   Musculoskeletal:         General: Normal range of motion.      Cervical back: Normal range of motion.      Comments: Chest pain not reproducible   Skin:     General: Skin is warm and dry.      Capillary Refill: Capillary refill takes less than 2 seconds.   Neurological:      General: No focal deficit present.      Mental Status: She is alert. Mental status is at baseline.   Psychiatric:         Mood and Affect: Mood normal.         Thought Content: Thought content normal.               Significant Labs: All pertinent labs  within the past 24 hours have been reviewed.    Significant Imaging: I have reviewed all pertinent imaging results/findings within the past 24 hours.

## 2025-03-17 NOTE — ED NOTES
Telemetry Verification   Patient placed on Telemetry Box  Verified with War Room  Box # [id number]   Monitor Tech    Rate    Rhythm

## 2025-03-17 NOTE — SUBJECTIVE & OBJECTIVE
Past Medical History:   Diagnosis Date    Arthritis     Asthma     as child    CVA (cerebral vascular accident)     Hyperlipemia     Hypertension        Past Surgical History:   Procedure Laterality Date     SECTION, CLASSIC         Review of patient's allergies indicates:  No Known Allergies    No current facility-administered medications on file prior to encounter.     Current Outpatient Medications on File Prior to Encounter   Medication Sig    AMLODIPINE BESYLATE/BENAZEPRIL (LOTREL ORAL) Take by mouth. States blue pill, does not know dose.    butalbital-acetaminophen-caffeine -40 mg (FIORICET, ESGIC) -40 mg per tablet Take 1-2 tablets by mouth every 6 (six) hours as needed for Headaches.    carisoprodol (SOMA) 350 MG tablet Take 350 mg by mouth 4 (four) times daily as needed for Muscle spasms.    esomeprazole (NEXIUM) 40 MG capsule Take 1 capsule (40 mg total) by mouth once daily.    omeprazole (PRILOSEC) 40 MG capsule Take 40 mg by mouth once daily.    oxycodone-acetaminophen (PERCOCET)  mg per tablet Take 1 tablet by mouth every 4 (four) hours as needed for Pain.     Family History       Problem Relation (Age of Onset)    Heart attack Father    Hypertension Mother    Stroke Mother          Tobacco Use    Smoking status: Some Days     Current packs/day: 0.50     Types: Cigarettes    Smokeless tobacco: Never   Substance and Sexual Activity    Alcohol use: Yes     Comment: occas, none recently    Drug use: Yes     Types: Cocaine     Comment: smoke crack cocaine 4 days PTA    Sexual activity: Yes     Partners: Male     Birth control/protection: None     Review of Systems   Constitutional:  Positive for diaphoresis. Negative for activity change, appetite change, chills, fatigue and fever.   Respiratory:  Positive for chest tightness and shortness of breath. Negative for cough and wheezing.    Cardiovascular:  Positive for chest pain. Negative for palpitations and leg swelling.    Gastrointestinal:  Negative for abdominal pain, constipation, diarrhea, nausea and vomiting.   Genitourinary:  Positive for frequency.   Neurological:  Negative for dizziness, syncope and numbness.     Objective:     Vital Signs (Most Recent):  Temp: 98.6 °F (37 °C) (03/16/25 1009)  Pulse: 71 (03/16/25 1734)  Resp: 16 (03/16/25 1734)  BP: (!) 162/80 (03/16/25 1734)  SpO2: 98 % (03/16/25 1734) Vital Signs (24h Range):  Temp:  [98.6 °F (37 °C)] 98.6 °F (37 °C)  Pulse:  [69-83] 71  Resp:  [16-22] 16  SpO2:  [95 %-98 %] 98 %  BP: (152-177)/(74-96) 162/80     Weight: 108.9 kg (240 lb)  Body mass index is 39.94 kg/m².     Physical Exam      Physical Exam  Gen: in NAD, appears stated age  Neuro: AAOx4, CN2-12 grossly intact BL; weakness in RUE and RLE. Particularly stiff hand.   HEENT: NTNC, EOMI, PERRLA, MMM; almost edentulous   CVS: RRR, no m/r/g; S1/S2 auscultated with no S3 or S4; capillary refill < 2 sec  Resp: lungs CTAB, no w/r/r; no belabored breathing or accessory muscle use appreciated   Abd: BS+ in all 4 quadrants; NTND, soft to palpation; no organomegaly appreciated   Extrem: pulses full, equal, and regular over all 4 extremities; no UE or LE edema BL       Significant Labs: All pertinent labs within the past 24 hours have been reviewed.    Significant Imaging: I have reviewed all pertinent imaging results/findings within the past 24 hours.

## 2025-03-17 NOTE — ASSESSMENT & PLAN NOTE
Hx of left sided thalamic stroke with residual right sided deficits. Had more recent  right carotid stenosis induced stroke in 02/2025 with a carotid stent place 02/07/2025.     - cont ASA + brilinta (unclear home compliance)  - cont atorvastatin       Antithrombotics for secondary stroke prevention: Antiplatelets: Aspirin: 81 mg daily + Brilinta BID     Statins for secondary stroke prevention and hyperlipidemia, if present:   Statins: Atorvastatin- 40 mg daily    Aggressive risk factor modification: HTN, Smoking, HLD, Exercise, CAD     Rehab efforts: The patient has been evaluated by a stroke team provider and the therapy needs have been fully considered based off the presenting complaints and exam findings. The following therapy evaluations are needed: PT evaluate and treat    Diagnostics ordered/pending: Other: patient's work up has already been done at [facility] [place]     VTE prophylaxis: Heparin 5000 units SQ every 8 hours    BP parameters:  BP < 140/90 goal, no strict BP parameters

## 2025-03-17 NOTE — HOSPITAL COURSE
Admitted with the chest pain, calculated HEART score 4. Trops negative x2. CTA negative for PE. Likely in the setting of cocaine use and also uncontrolled HTN. Stress test initially delayed due to hypokalemia.  Was done today, however unable to be completed due to noted left ventricular cavity obstruction.  Cardiology recommended PET stress test which was done 3/19.  No abnormalities noted.  Counseled on importance of cocaine cessation.  Medications refilled, stable for discharge

## 2025-03-18 LAB
ALBUMIN SERPL BCP-MCNC: 3.3 G/DL (ref 3.5–5.2)
ALP SERPL-CCNC: 101 U/L (ref 40–150)
ALT SERPL W/O P-5'-P-CCNC: 6 U/L (ref 10–44)
ANION GAP SERPL CALC-SCNC: 14 MMOL/L (ref 8–16)
ASCENDING AORTA: 2.86 CM
AST SERPL-CCNC: 10 U/L (ref 10–40)
AV LVOT MEAN GRADIENT: 3 MMHG
AV LVOT PEAK GRADIENT: 7 MMHG
BASOPHILS # BLD AUTO: 0.03 K/UL (ref 0–0.2)
BASOPHILS NFR BLD: 0.5 % (ref 0–1.9)
BILIRUB SERPL-MCNC: 0.4 MG/DL (ref 0.1–1)
BSA FOR ECHO PROCEDURE: 2.23 M2
BUN SERPL-MCNC: 18 MG/DL (ref 6–20)
CALCIUM SERPL-MCNC: 9.5 MG/DL (ref 8.7–10.5)
CHLORIDE SERPL-SCNC: 111 MMOL/L (ref 95–110)
CO2 SERPL-SCNC: 18 MMOL/L (ref 23–29)
CREAT SERPL-MCNC: 1 MG/DL (ref 0.5–1.4)
CV ECHO LV RWT: 0.45 CM
DIFFERENTIAL METHOD BLD: ABNORMAL
DOP CALC LVOT AREA: 3.5 CM2
DOP CALC LVOT DIAMETER: 2.1 CM
DOP CALC LVOT PEAK VEL: 1.3 M/S
DOP CALC LVOT STROKE VOLUME: 87.2 CM3
DOP CALCLVOT PEAK VEL VTI: 25.2 CM
E WAVE DECELERATION TIME: 285 MS
E/A RATIO: 0.73
E/E' RATIO: 7 M/S
ECHO EF ESTIMATED: 68 %
ECHO LV POSTERIOR WALL: 0.9 CM (ref 0.6–1.1)
EOSINOPHIL # BLD AUTO: 0.3 K/UL (ref 0–0.5)
EOSINOPHIL NFR BLD: 4.5 % (ref 0–8)
ERYTHROCYTE [DISTWIDTH] IN BLOOD BY AUTOMATED COUNT: 13.4 % (ref 11.5–14.5)
EST. GFR  (NO RACE VARIABLE): >60 ML/MIN/1.73 M^2
FRACTIONAL SHORTENING: 37.5 % (ref 28–44)
GLUCOSE SERPL-MCNC: 81 MG/DL (ref 70–110)
HCT VFR BLD AUTO: 36.5 % (ref 37–48.5)
HGB BLD-MCNC: 11.6 G/DL (ref 12–16)
IMM GRANULOCYTES # BLD AUTO: 0.02 K/UL (ref 0–0.04)
IMM GRANULOCYTES NFR BLD AUTO: 0.3 % (ref 0–0.5)
INTERVENTRICULAR SEPTUM: 1.2 CM (ref 0.6–1.1)
LEFT ATRIUM SIZE: 3.4 CM
LEFT ATRIUM VOLUME INDEX MOD: 31 ML/M2
LEFT ATRIUM VOLUME MOD: 67 ML
LEFT INTERNAL DIMENSION IN SYSTOLE: 2.5 CM (ref 2.1–4)
LEFT VENTRICLE DIASTOLIC VOLUME INDEX: 33.18 ML/M2
LEFT VENTRICLE DIASTOLIC VOLUME: 71 ML
LEFT VENTRICLE MASS INDEX: 63.6 G/M2
LEFT VENTRICLE SYSTOLIC VOLUME INDEX: 10.7 ML/M2
LEFT VENTRICLE SYSTOLIC VOLUME: 23 ML
LEFT VENTRICULAR INTERNAL DIMENSION IN DIASTOLE: 4 CM (ref 3.5–6)
LEFT VENTRICULAR MASS: 136.2 G
LV LATERAL E/E' RATIO: 5.8
LV SEPTAL E/E' RATIO: 7.7
LYMPHOCYTES # BLD AUTO: 1.9 K/UL (ref 1–4.8)
LYMPHOCYTES NFR BLD: 31.4 % (ref 18–48)
MAGNESIUM SERPL-MCNC: 2.5 MG/DL (ref 1.6–2.6)
MCH RBC QN AUTO: 26.9 PG (ref 27–31)
MCHC RBC AUTO-ENTMCNC: 31.8 G/DL (ref 32–36)
MCV RBC AUTO: 85 FL (ref 82–98)
MONOCYTES # BLD AUTO: 0.5 K/UL (ref 0.3–1)
MONOCYTES NFR BLD: 8.9 % (ref 4–15)
MV A" WAVE DURATION": 133.21 MS
MV PEAK A VEL: 0.63 M/S
MV PEAK E VEL: 0.46 M/S
NEUTROPHILS # BLD AUTO: 3.3 K/UL (ref 1.8–7.7)
NEUTROPHILS NFR BLD: 54.4 % (ref 38–73)
NRBC BLD-RTO: 0 /100 WBC
OHS QRS DURATION: 92 MS
OHS QTC CALCULATION: 489 MS
PHOSPHATE SERPL-MCNC: 3.4 MG/DL (ref 2.7–4.5)
PLATELET # BLD AUTO: 287 K/UL (ref 150–450)
PMV BLD AUTO: 10.2 FL (ref 9.2–12.9)
POCT GLUCOSE: 110 MG/DL (ref 70–110)
POCT GLUCOSE: 82 MG/DL (ref 70–110)
POCT GLUCOSE: 97 MG/DL (ref 70–110)
POCT GLUCOSE: 97 MG/DL (ref 70–110)
POTASSIUM SERPL-SCNC: 3.8 MMOL/L (ref 3.5–5.1)
PROT SERPL-MCNC: 7.7 G/DL (ref 6–8.4)
PULM VEIN A" WAVE DURATION": 133.21 MS
PULM VEIN S/D RATIO: 1.51
PULMONIC VEIN PEAK A VELOCITY: 0.2 M/S
PV PEAK D VEL: 0.35 M/S
PV PEAK S VEL: 0.53 M/S
RA PRESSURE ESTIMATED: 3 MMHG
RBC # BLD AUTO: 4.31 M/UL (ref 4–5.4)
SINUS: 2.79 CM
SODIUM SERPL-SCNC: 143 MMOL/L (ref 136–145)
STJ: 2.5 CM
TDI LATERAL: 0.08 M/S
TDI SEPTAL: 0.06 M/S
TDI: 0.07 M/S
TRICUSPID ANNULAR PLANE SYSTOLIC EXCURSION: 1.73 CM
TROPONIN I SERPL DL<=0.01 NG/ML-MCNC: <3 NG/L (ref 0–14)
WBC # BLD AUTO: 6.06 K/UL (ref 3.9–12.7)
Z-SCORE OF LEFT VENTRICULAR DIMENSION IN END DIASTOLE: -5.43
Z-SCORE OF LEFT VENTRICULAR DIMENSION IN END SYSTOLE: -4.09

## 2025-03-18 PROCEDURE — 96372 THER/PROPH/DIAG INJ SC/IM: CPT

## 2025-03-18 PROCEDURE — 85025 COMPLETE CBC W/AUTO DIFF WBC: CPT

## 2025-03-18 PROCEDURE — 93010 ELECTROCARDIOGRAM REPORT: CPT | Mod: ,,, | Performed by: INTERNAL MEDICINE

## 2025-03-18 PROCEDURE — G0378 HOSPITAL OBSERVATION PER HR: HCPCS

## 2025-03-18 PROCEDURE — 84484 ASSAY OF TROPONIN QUANT: CPT

## 2025-03-18 PROCEDURE — 80053 COMPREHEN METABOLIC PANEL: CPT

## 2025-03-18 PROCEDURE — 93005 ELECTROCARDIOGRAM TRACING: CPT

## 2025-03-18 PROCEDURE — 83735 ASSAY OF MAGNESIUM: CPT

## 2025-03-18 PROCEDURE — 25000242 PHARM REV CODE 250 ALT 637 W/ HCPCS: Performed by: NURSE PRACTITIONER

## 2025-03-18 PROCEDURE — 25000003 PHARM REV CODE 250

## 2025-03-18 PROCEDURE — 25000003 PHARM REV CODE 250: Performed by: STUDENT IN AN ORGANIZED HEALTH CARE EDUCATION/TRAINING PROGRAM

## 2025-03-18 PROCEDURE — 25000003 PHARM REV CODE 250: Performed by: NURSE PRACTITIONER

## 2025-03-18 PROCEDURE — 63600175 PHARM REV CODE 636 W HCPCS

## 2025-03-18 PROCEDURE — 84100 ASSAY OF PHOSPHORUS: CPT

## 2025-03-18 PROCEDURE — S4991 NICOTINE PATCH NONLEGEND: HCPCS

## 2025-03-18 PROCEDURE — 36415 COLL VENOUS BLD VENIPUNCTURE: CPT

## 2025-03-18 RX ADMIN — HEPARIN SODIUM 5000 UNITS: 5000 INJECTION INTRAVENOUS; SUBCUTANEOUS at 05:03

## 2025-03-18 RX ADMIN — PANTOPRAZOLE SODIUM 40 MG: 40 TABLET, DELAYED RELEASE ORAL at 08:03

## 2025-03-18 RX ADMIN — Medication 1 PATCH: at 08:03

## 2025-03-18 RX ADMIN — HEPARIN SODIUM 5000 UNITS: 5000 INJECTION INTRAVENOUS; SUBCUTANEOUS at 09:03

## 2025-03-18 RX ADMIN — NITROGLYCERIN 0.4 MG: 0.4 TABLET, ORALLY DISINTEGRATING SUBLINGUAL at 03:03

## 2025-03-18 RX ADMIN — OXYCODONE AND ACETAMINOPHEN 1 TABLET: 325; 10 TABLET ORAL at 08:03

## 2025-03-18 RX ADMIN — ATORVASTATIN CALCIUM 80 MG: 40 TABLET, FILM COATED ORAL at 08:03

## 2025-03-18 RX ADMIN — GABAPENTIN 600 MG: 300 CAPSULE ORAL at 02:03

## 2025-03-18 RX ADMIN — AMLODIPINE BESYLATE 10 MG: 10 TABLET ORAL at 08:03

## 2025-03-18 RX ADMIN — TICAGRELOR 90 MG: 90 TABLET ORAL at 08:03

## 2025-03-18 RX ADMIN — GABAPENTIN 600 MG: 300 CAPSULE ORAL at 08:03

## 2025-03-18 RX ADMIN — HEPARIN SODIUM 5000 UNITS: 5000 INJECTION INTRAVENOUS; SUBCUTANEOUS at 02:03

## 2025-03-18 RX ADMIN — LOSARTAN POTASSIUM 100 MG: 50 TABLET, FILM COATED ORAL at 08:03

## 2025-03-18 RX ADMIN — GABAPENTIN 600 MG: 300 CAPSULE ORAL at 09:03

## 2025-03-18 RX ADMIN — ALUMINUM HYDROXIDE, MAGNESIUM HYDROXIDE, AND SIMETHICONE 30 ML: 200; 200; 20 SUSPENSION ORAL at 04:03

## 2025-03-18 RX ADMIN — ASPIRIN 81 MG CHEWABLE TABLET 81 MG: 81 TABLET CHEWABLE at 08:03

## 2025-03-18 RX ADMIN — OXYCODONE AND ACETAMINOPHEN 1 TABLET: 325; 10 TABLET ORAL at 06:03

## 2025-03-18 RX ADMIN — TICAGRELOR 90 MG: 90 TABLET ORAL at 09:03

## 2025-03-18 RX ADMIN — Medication 6 MG: at 09:03

## 2025-03-18 NOTE — ASSESSMENT & PLAN NOTE
Patient's blood pressure range in the last 24 hours was: BP  Min: 109/77  Max: 174/76.The patient's inpatient anti-hypertensive regimen is listed below:  Current Antihypertensives  , Daily, Oral  losartan tablet 100 mg, Daily, Oral  amLODIPine tablet 10 mg, Daily, Oral  hydrALAZINE tablet 25 mg, Every 8 hours PRN, Oral    Plan  - BP is uncontrolled, will adjust as follows: will restart home medications, unclear compliance   -Hydralazine prn

## 2025-03-18 NOTE — SUBJECTIVE & OBJECTIVE
Interval History: Seen this AM at bedside.  No acute events reported overnight.  Denies active chest pain    Review of Systems  Objective:     Vital Signs (Most Recent):  Temp: 98.8 °F (37.1 °C) (03/18/25 1210)  Pulse: 61 (03/18/25 1210)  Resp: 16 (03/18/25 1210)  BP: (!) 174/76 (03/18/25 1210)  SpO2: 98 % (03/18/25 1210) Vital Signs (24h Range):  Temp:  [97.8 °F (36.6 °C)-98.8 °F (37.1 °C)] 98.8 °F (37.1 °C)  Pulse:  [] 61  Resp:  [16-20] 16  SpO2:  [94 %-100 %] 98 %  BP: (109-174)/(68-77) 174/76     Weight: 108.9 kg (240 lb)  Body mass index is 39.94 kg/m².    Intake/Output Summary (Last 24 hours) at 3/18/2025 1237  Last data filed at 3/17/2025 1610  Gross per 24 hour   Intake 320 ml   Output --   Net 320 ml         Physical Exam  Constitutional:       General: She is not in acute distress.     Appearance: Normal appearance.   HENT:      Head: Normocephalic and atraumatic.   Eyes:      Pupils: Pupils are equal, round, and reactive to light.   Cardiovascular:      Rate and Rhythm: Normal rate and regular rhythm.      Pulses: Normal pulses.   Pulmonary:      Effort: Pulmonary effort is normal. No respiratory distress.      Breath sounds: Normal breath sounds. No wheezing.   Abdominal:      General: Abdomen is flat. Bowel sounds are normal. There is no distension.      Palpations: Abdomen is soft.      Tenderness: There is no abdominal tenderness.   Musculoskeletal:         General: Normal range of motion.      Cervical back: Normal range of motion.      Comments: Chest pain not reproducible   Skin:     General: Skin is warm and dry.      Capillary Refill: Capillary refill takes less than 2 seconds.   Neurological:      General: No focal deficit present.      Mental Status: She is alert. Mental status is at baseline.   Psychiatric:         Mood and Affect: Mood normal.         Thought Content: Thought content normal.               Significant Labs: All pertinent labs within the past 24 hours have been  reviewed.    Significant Imaging: I have reviewed all pertinent imaging results/findings within the past 24 hours.

## 2025-03-18 NOTE — ASSESSMENT & PLAN NOTE
Hx of left sided thalamic stroke with residual right sided deficits. Had more recent  right carotid stenosis induced stroke in 02/2025 with a carotid stent place 02/07/2025.     - cont ASA + brilinta (unclear home compliance)  - cont atorvastatin       Antithrombotics for secondary stroke prevention: Antiplatelets: Aspirin: 81 mg daily + Brilinta BID     Statins for secondary stroke prevention and hyperlipidemia, if present:   Statins: Atorvastatin- 40 mg daily    Aggressive risk factor modification: HTN, Smoking, HLD, Exercise, CAD     Rehab efforts: The patient has been evaluated by a stroke team provider and the therapy needs have been fully considered based off the presenting complaints and exam findings. The following therapy evaluations are needed: PT evaluate and treat    Diagnostics ordered/pending: Other: patient's work up has already been done at [facility] [place]     VTE prophylaxis: Heparin 5000 units SQ every 8 hours    BP parameters: BP < 140/90 goal, no strict BP parameters

## 2025-03-18 NOTE — PROGRESS NOTES
[facility] [address] [place] [address] Medicine  Progress Note    Patient Name: Tamiko Mercado  MRN: [id number]  Patient Class: OP- Observation   Admission Date: 3/16/2025  Length of Stay: 0 days  Attending Physician: Torin Rosales DO  Primary Care Provider: Jes, Primary Doctor        Subjective     Principal Problem:Chest pain        HPI:  58 yo female with Pmhx of COPD, HTN, CVA s/p R carotid stent placed 02/2025 on brilinta/plavix/statin who presents to the ED for acute midsternal chest pain.    Chest pain started at rest at 8pm on 03/16 mostly mid to left sternal with some radiation to the back and up neck but none to the arm. Accompanied by shortness of breath and diaphoresis. Patient went to bed and woke up in the middle of the night with pain still present so she came to the ED. Pain improved after being given NG in the ED.    Prior to this episode patient has never had chest pain or shortness of breath before, either at rest or with exertion. Yesterday patient was at the [employer]. She smoked a vape and had half  daiquiri. Patient smokes cocaine every couple of days but last had cocaine 3 days ago. Patient is unclear what kind of medication she takes except for a BP pill and aspirin.     Social hx:  Lives: with children, uses motorozied wheelchair 2/2 to residual weakness from prior CVA   Works: retired  ETOH: some  Tobacco: 1/2 pack daily  Drugs: cocaine, every couple of days        In the ED: trops negative x2. EKG with no acute ischemic changes. CTA negative for PE. Mildy hypertensive. Heart score of  4.     Overview/Hospital Course:  Admitted with the chest pain, calculated HEART score 4. Trops negative x2. CTA negative for PE. Likely in the setting of cocaine use and also uncontrolled HTN. Stress test initially delayed due to hypokalemia.  Was done today, however unable to be completed due to noted left ventricular cavity obstruction.  Cardiology recommended PET stress test which will be done  tmrw    Interval History: Seen this AM at bedside.  No acute events reported overnight.  Denies active chest pain    Review of Systems  Objective:     Vital Signs (Most Recent):  Temp: 98.8 °F (37.1 °C) (03/18/25 1210)  Pulse: 61 (03/18/25 1210)  Resp: 16 (03/18/25 1210)  BP: (!) 174/76 (03/18/25 1210)  SpO2: 98 % (03/18/25 1210) Vital Signs (24h Range):  Temp:  [97.8 °F (36.6 °C)-98.8 °F (37.1 °C)] 98.8 °F (37.1 °C)  Pulse:  [] 61  Resp:  [16-20] 16  SpO2:  [94 %-100 %] 98 %  BP: (109-174)/(68-77) 174/76     Weight: 108.9 kg (240 lb)  Body mass index is 39.94 kg/m².    Intake/Output Summary (Last 24 hours) at 3/18/2025 1237  Last data filed at 3/17/2025 1610  Gross per 24 hour   Intake 320 ml   Output --   Net 320 ml         Physical Exam  Constitutional:       General: She is not in acute distress.     Appearance: Normal appearance.   HENT:      Head: Normocephalic and atraumatic.   Eyes:      Pupils: Pupils are equal, round, and reactive to light.   Cardiovascular:      Rate and Rhythm: Normal rate and regular rhythm.      Pulses: Normal pulses.   Pulmonary:      Effort: Pulmonary effort is normal. No respiratory distress.      Breath sounds: Normal breath sounds. No wheezing.   Abdominal:      General: Abdomen is flat. Bowel sounds are normal. There is no distension.      Palpations: Abdomen is soft.      Tenderness: There is no abdominal tenderness.   Musculoskeletal:         General: Normal range of motion.      Cervical back: Normal range of motion.      Comments: Chest pain not reproducible   Skin:     General: Skin is warm and dry.      Capillary Refill: Capillary refill takes less than 2 seconds.   Neurological:      General: No focal deficit present.      Mental Status: She is alert. Mental status is at baseline.   Psychiatric:         Mood and Affect: Mood normal.         Thought Content: Thought content normal.               Significant Labs: All pertinent labs within the past 24 hours have been  reviewed.    Significant Imaging: I have reviewed all pertinent imaging results/findings within the past 24 hours.      Assessment & Plan  Chest pain  Started at rest. Mid sternal. Started 8pm on 03/15. Resolved with nitroglycerin. No prior Hx of chest pain at rest or with exertion. Cocaine use  - s/p NG x1 in ED   - trops negative x2  - EKG with no ischemic changes.   - TTE ordered, wnl  Plan:  - stress TTE attempted today however unable to be completed due to apparent left ventricular cavity obstruction   - nuclear stress to be done tomorrow  - tele  - PRN nitroglycerin     HTN (hypertension)  Patient's blood pressure range in the last 24 hours was: BP  Min: 109/77  Max: 174/76.The patient's inpatient anti-hypertensive regimen is listed below:  Current Antihypertensives  , Daily, Oral  losartan tablet 100 mg, Daily, Oral  amLODIPine tablet 10 mg, Daily, Oral  hydrALAZINE tablet 25 mg, Every 8 hours PRN, Oral    Plan  - BP is uncontrolled, will adjust as follows: will restart home medications, unclear compliance   -Hydralazine prn    Cerebrovascular accident (CVA) due to stenosis of right carotid artery  Hx of left sided thalamic stroke with residual right sided deficits. Had more recent  right carotid stenosis induced stroke in 02/2025 with a carotid stent place 02/07/2025.     - cont ASA + brilinta (unclear home compliance)  - cont atorvastatin       Antithrombotics for secondary stroke prevention: Antiplatelets: Aspirin: 81 mg daily + Brilinta BID     Statins for secondary stroke prevention and hyperlipidemia, if present:   Statins: Atorvastatin- 40 mg daily    Aggressive risk factor modification: HTN, Smoking, HLD, Exercise, CAD     Rehab efforts: The patient has been evaluated by a stroke team provider and the therapy needs have been fully considered based off the presenting complaints and exam findings. The following therapy evaluations are needed: PT evaluate and treat    Diagnostics ordered/pending: Other:  patient's work up has already been done [place] [facility] [place]     VTE prophylaxis: Heparin 5000 units SQ every 8 hours    BP parameters:  BP < 140/90 goal, no strict BP parameters       Substance use  Tobacco use 1/2 pack day. Cocaine use Several times a week.     - nicotine patch         Urinary incontinence  Episode of urinary incontinence during chest pain. Denies dysuria.     - UA ordered  - will monitor for recurrence     ACP (advance care planning)  Discussed with patient code status on admit. Patient would prefer to be DNR.     Hypokalemia  Patient's most recent potassium results are listed below.   Recent Labs     03/17/25  0506 03/17/25  1142 03/18/25  0424   K 2.9* 3.0* 3.8     Plan  - Replete potassium per protocol  - Monitor potassium Daily  - Patient's hypokalemia is stable    VTE Risk Mitigation (From admission, onward)           Ordered     heparin (porcine) injection 5,000 Units  Every 8 hours         03/16/25 1642     IP VTE HIGH RISK PATIENT  Once         03/16/25 1642     Place sequential compression device  Until discontinued         03/16/25 1642                    Discharge Planning   SALINAS: 3/18/2025     Code Status: DNR   Medical Readiness for Discharge Date:   Discharge Plan A: Home with family                        Torin Rosales DO  Department of Hospital Medicine   [facility]

## 2025-03-18 NOTE — PROGRESS NOTES
Pt here for dse today. Echo done. Sonographer showed dr anton staff cards the images. Md cx stress portion and recommending a PET scan. Dr Dutton made aware of above. Report given to Alon Metcalf RN.

## 2025-03-18 NOTE — ASSESSMENT & PLAN NOTE
Started at rest. Mid sternal. Started 8pm on 03/15. Resolved with nitroglycerin. No prior Hx of chest pain at rest or with exertion. Cocaine use  - s/p NG x1 in ED   - trops negative x2  - EKG with no ischemic changes.   - TTE ordered, wnl  Plan:  - stress TTE attempted today however unable to be completed due to apparent left ventricular cavity obstruction   - nuclear stress to be done tomorrow  - tele  - PRN nitroglycerin

## 2025-03-18 NOTE — ASSESSMENT & PLAN NOTE
Patient's most recent potassium results are listed below.   Recent Labs     03/17/25  0506 03/17/25  1142 03/18/25  0424   K 2.9* 3.0* 3.8     Plan  - Replete potassium per protocol  - Monitor potassium Daily  - Patient's hypokalemia is stable

## 2025-03-19 VITALS
HEIGHT: 65 IN | OXYGEN SATURATION: 98 % | WEIGHT: 240.31 LBS | SYSTOLIC BLOOD PRESSURE: 158 MMHG | TEMPERATURE: 99 F | RESPIRATION RATE: 16 BRPM | DIASTOLIC BLOOD PRESSURE: 74 MMHG | HEART RATE: 79 BPM | BODY MASS INDEX: 40.04 KG/M2

## 2025-03-19 LAB
CFR FLOW - ANTERIOR: 2.21
CFR FLOW - INFERIOR: 2.33
CFR FLOW - LATERAL: 2.01
CFR FLOW - MAX: 3.03
CFR FLOW - MIN: 1.87
CFR FLOW - SEPTAL: 2.42
CFR FLOW - WHOLE HEART: 2.24
CV STRESS BASE HR: 69 BPM
DIASTOLIC BLOOD PRESSURE: 74 MMHG
EJECTION FRACTION- HIGH: 59 %
END DIASTOLIC INDEX-HIGH: 155 ML/M2
END DIASTOLIC INDEX-LOW: 91 ML/M2
END SYSTOLIC INDEX-HIGH: 78 ML/M2
END SYSTOLIC INDEX-LOW: 40 ML/M2
NUC REST DIASTOLIC VOLUME INDEX: 87
NUC REST EJECTION FRACTION: 69
NUC REST SYSTOLIC VOLUME INDEX: 27
NUC STRESS DIASTOLIC VOLUME INDEX: 99
NUC STRESS EJECTION FRACTION: 70 %
NUC STRESS SYSTOLIC VOLUME INDEX: 30
OHS CV CPX 1 MINUTE RECOVERY HEART RATE: 92 BPM
OHS CV CPX 85 PERCENT MAX PREDICTED HEART RATE MALE: 136
OHS CV CPX MAX PREDICTED HEART RATE: 160
OHS CV CPX PATIENT IS FEMALE: 1
OHS CV CPX PATIENT IS MALE: 0
OHS CV CPX PEAK HEAR RATE: 75 BPM
OHS CV CPX PERCENT MAX PREDICTED HEART RATE ACHIEVED: 49
OHS CV CPX RATE PRESSURE PRODUCT PRESENTING: NORMAL
OHS CV INITIAL DOSE: 33 MCG/KG/MIN
OHS CV MODERATELY REDUCED FLOW CAPACITY: 0 %
OHS CV NO ISCHEMIA MILDLY REDUCED FLOW CAPACTY: 10 %
OHS CV NO ISCHEMIA MINIMALLY REDUCED FLOW CAPACITY: 18 %
OHS CV NORMAL FLOW CAPACITY COMPARABLE TO HEALTHY YOUNG VOLUNTEERS: 73 %
OHS CV PEAK DOSE: 32.6 MCG/KG/MIN
OHS CV PET ID: 8392
OHS CV SEVERELY REDUCED FLOW CAPACITY: 0 %
OHS CV TOTAL EXAM DLP: 400.24 MGY-CM
REST FLOW - ANTERIOR: 1.14 CC/MIN/G
REST FLOW - INFERIOR: 1.04 CC/MIN/G
REST FLOW - LATERAL: 1.33 CC/MIN/G
REST FLOW - MAX: 1.64 CC/MIN/G
REST FLOW - MIN: 0.55 CC/MIN/G
REST FLOW - SEPTAL: 1.04 CC/MIN/G
REST FLOW - WHOLE HEART: 1.14 CC/MIN/G
RETIRED EF AND QEF - SEE NOTES: 47 %
STRESS FLOW - ANTERIOR: 2.5 CC/MIN/G
STRESS FLOW - INFERIOR: 2.41 CC/MIN/G
STRESS FLOW - LATERAL: 2.68 CC/MIN/G
STRESS FLOW - MAX: 3.11 CC/MIN/G
STRESS FLOW - MIN: 1.62 CC/MIN/G
STRESS FLOW - SEPTAL: 2.5 CC/MIN/G
STRESS FLOW - WHOLE HEART: 2.52 CC/MIN/G
SYSTOLIC BLOOD PRESSURE: 185 MMHG

## 2025-03-19 PROCEDURE — A9555 RB82 RUBIDIUM: HCPCS | Performed by: STUDENT IN AN ORGANIZED HEALTH CARE EDUCATION/TRAINING PROGRAM

## 2025-03-19 PROCEDURE — 96375 TX/PRO/DX INJ NEW DRUG ADDON: CPT

## 2025-03-19 PROCEDURE — 25000003 PHARM REV CODE 250

## 2025-03-19 PROCEDURE — S4991 NICOTINE PATCH NONLEGEND: HCPCS

## 2025-03-19 PROCEDURE — 94761 N-INVAS EAR/PLS OXIMETRY MLT: CPT

## 2025-03-19 PROCEDURE — 63600175 PHARM REV CODE 636 W HCPCS: Performed by: STUDENT IN AN ORGANIZED HEALTH CARE EDUCATION/TRAINING PROGRAM

## 2025-03-19 PROCEDURE — 96374 THER/PROPH/DIAG INJ IV PUSH: CPT | Mod: 59

## 2025-03-19 PROCEDURE — 63600175 PHARM REV CODE 636 W HCPCS

## 2025-03-19 PROCEDURE — 25000003 PHARM REV CODE 250: Performed by: NURSE PRACTITIONER

## 2025-03-19 PROCEDURE — 25000003 PHARM REV CODE 250: Performed by: STUDENT IN AN ORGANIZED HEALTH CARE EDUCATION/TRAINING PROGRAM

## 2025-03-19 PROCEDURE — G0378 HOSPITAL OBSERVATION PER HR: HCPCS

## 2025-03-19 PROCEDURE — 96372 THER/PROPH/DIAG INJ SC/IM: CPT

## 2025-03-19 RX ORDER — AMLODIPINE AND BENAZEPRIL HYDROCHLORIDE 10; 40 MG/1; MG/1
1 CAPSULE ORAL DAILY
Qty: 90 CAPSULE | Refills: 3 | Status: SHIPPED | OUTPATIENT
Start: 2025-03-19 | End: 2026-03-19

## 2025-03-19 RX ORDER — OMEPRAZOLE 40 MG/1
40 CAPSULE, DELAYED RELEASE ORAL DAILY
Qty: 90 CAPSULE | Refills: 1 | Status: SHIPPED | OUTPATIENT
Start: 2025-03-19

## 2025-03-19 RX ORDER — AMINOPHYLLINE 25 MG/ML
75 INJECTION, SOLUTION INTRAVENOUS ONCE
Status: COMPLETED | OUTPATIENT
Start: 2025-03-19 | End: 2025-03-19

## 2025-03-19 RX ORDER — REGADENOSON 0.08 MG/ML
0.4 INJECTION, SOLUTION INTRAVENOUS ONCE
Status: COMPLETED | OUTPATIENT
Start: 2025-03-19 | End: 2025-03-19

## 2025-03-19 RX ORDER — ACETAMINOPHEN 325 MG/1
650 TABLET ORAL EVERY 8 HOURS PRN
Qty: 30 TABLET | Refills: 0 | Status: SHIPPED | OUTPATIENT
Start: 2025-03-19

## 2025-03-19 RX ORDER — BUTALBITAL, ACETAMINOPHEN AND CAFFEINE 50; 325; 40 MG/1; MG/1; MG/1
1-2 TABLET ORAL EVERY 6 HOURS PRN
Qty: 18 TABLET | Refills: 0 | Status: SHIPPED | OUTPATIENT
Start: 2025-03-19

## 2025-03-19 RX ADMIN — RUBIDIUM CHLORIDE RB-82 32.6 MILLICURIE: 150 INJECTION, SOLUTION INTRAVENOUS at 07:03

## 2025-03-19 RX ADMIN — OXYCODONE AND ACETAMINOPHEN 1 TABLET: 325; 10 TABLET ORAL at 10:03

## 2025-03-19 RX ADMIN — Medication 1 PATCH: at 10:03

## 2025-03-19 RX ADMIN — AMLODIPINE BESYLATE 10 MG: 10 TABLET ORAL at 10:03

## 2025-03-19 RX ADMIN — ATORVASTATIN CALCIUM 80 MG: 40 TABLET, FILM COATED ORAL at 10:03

## 2025-03-19 RX ADMIN — TICAGRELOR 90 MG: 90 TABLET ORAL at 10:03

## 2025-03-19 RX ADMIN — GABAPENTIN 600 MG: 300 CAPSULE ORAL at 03:03

## 2025-03-19 RX ADMIN — LOSARTAN POTASSIUM 100 MG: 50 TABLET, FILM COATED ORAL at 10:03

## 2025-03-19 RX ADMIN — ASPIRIN 81 MG CHEWABLE TABLET 81 MG: 81 TABLET CHEWABLE at 10:03

## 2025-03-19 RX ADMIN — REGADENOSON 0.4 MG: 0.08 INJECTION, SOLUTION INTRAVENOUS at 07:03

## 2025-03-19 RX ADMIN — RUBIDIUM CHLORIDE RB-82 33 MILLICURIE: 150 INJECTION, SOLUTION INTRAVENOUS at 07:03

## 2025-03-19 RX ADMIN — AMINOPHYLLINE 75 MG: 25 INJECTION, SOLUTION INTRAVENOUS at 07:03

## 2025-03-19 RX ADMIN — HEPARIN SODIUM 5000 UNITS: 5000 INJECTION INTRAVENOUS; SUBCUTANEOUS at 06:03

## 2025-03-19 RX ADMIN — GABAPENTIN 600 MG: 300 CAPSULE ORAL at 10:03

## 2025-03-19 RX ADMIN — PANTOPRAZOLE SODIUM 40 MG: 40 TABLET, DELAYED RELEASE ORAL at 10:03

## 2025-03-19 NOTE — DISCHARGE SUMMARY
[facility] [address] [place] [address] Medicine  Discharge Summary      Patient Name: Tamiko Mercado  MRN: [id number]  HALLE: [id number]  Patient Class: OP- Observation  Admission Date: 3/16/2025  Hospital Length of Stay: 0 days  Discharge Date and Time:  03/19/2025 1:00 PM  Attending Physician: Torin Rosales DO   Discharging Provider: Torin Rosales DO  Primary Care Provider: Jes Primary Doctor  [facility] Medicine Team: [facility]OS[facility] MED  Torin Rosales DO  Primary Care Team: [facility]OS[facility] MED G    HPI:   58 yo female with Pmhx of COPD, HTN, CVA s/p R carotid stent placed 02/2025 on brilinta/plavix/statin who presents to the ED for acute midsternal chest pain.    Chest pain started at rest at 8pm on 03/16 mostly mid to left sternal with some radiation to the back and up neck but none to the arm. Accompanied by shortness of breath and diaphoresis. Patient went to bed and woke up in the middle of the night with pain still present so she came to the ED. Pain improved after being given NG in the ED.    Prior to this episode patient has never had chest pain or shortness of breath before, either at rest or with exertion. Yesterday patient was at the [employer] [facility] [email or web address]. She smoked a vape and had half  daiquiri. Patient smokes cocaine every couple of days but last had cocaine 3 days ago. Patient is unclear what kind of medication she takes except for a BP pill and aspirin.     Social hx:  Lives: with children, uses motorozied wheelchair 2/2 to residual weakness from prior CVA   Works: retired  ETOH: some  Tobacco: 1/2 pack daily  Drugs: cocaine, every couple of days        In the ED: trops negative x2. EKG with no acute ischemic changes. CTA negative for PE. Mildy hypertensive. Heart score of  4.     * No surgery found *      Hospital Course:   Admitted with the chest pain, calculated HEART score 4. Trops negative x2. CTA negative for PE. Likely in the setting of cocaine use and also uncontrolled HTN. Stress test initially delayed due to  hypokalemia.  Was done today, however unable to be completed due to noted left ventricular cavity obstruction.  Cardiology recommended PET stress test which was done 3/19.  No abnormalities noted.  Counseled on importance of cocaine cessation.  Medications refilled, stable for discharge     Physical Exam  Gen: in NAD, appears stated age  Neuro: AAOx3, motor, sensory, and strength grossly intact BL  HEENT: NTNC, EOMI, PERRL, MMM  CVS: RRR, no m/r/g; S1/S2 auscultated with no S3 or S4; capillary refill < 2 sec  Resp: lungs CTAB, no w/r/r; no belabored breathing or accessory muscle use appreciated   Abd: BS+ in all 4 quadrants; NTND, soft to palpation; no organomegaly appreciated   Extrem: pulses full, equal, and regular over all 4 extremities; no UE or LE edema BL    Goals of Care Treatment Preferences:  Code Status: DNR      SDOH Screening:  The patient was screened for utility difficulties, food insecurity, transport difficulties, housing insecurity, and interpersonal safety and there were no concerns identified this admission.     Consults:   Consults (From admission, onward)          Status Ordering Provider     Inpatient consult to Midline team  Once        Provider:  (Not yet assigned)    Completed EILEEN BENAVIDES            Assessment & Plan  Chest pain  Started at rest. Mid sternal. Started 8pm on 03/15. Resolved with nitroglycerin. No prior Hx of chest pain at rest or with exertion. Cocaine use  - s/p NG x1 in ED   - trops negative x2  - EKG with no ischemic changes.   - TTE ordered, wnl  Plan:  - stress TTE attempted today however unable to be completed due to apparent left ventricular cavity obstruction   - nuclear stress to be done tomorrow  - tele  - PRN nitroglycerin     HTN (hypertension)  Patient's blood pressure range in the last 24 hours was: BP  Min: 130/81  Max: 185/74.The patient's inpatient anti-hypertensive regimen is listed below:  Current Antihypertensives  losartan tablet 100 mg, Daily,  Oral  amLODIPine tablet 10 mg, Daily, Oral  hydrALAZINE tablet 25 mg, Every 8 hours PRN, Oral  , Daily, Oral    Plan  - BP is uncontrolled, will adjust as follows: will restart home medications, unclear compliance   -Hydralazine prn    Cerebrovascular accident (CVA) due to stenosis of right carotid artery  Hx of left sided thalamic stroke with residual right sided deficits. Had more recent  right carotid stenosis induced stroke in 02/2025 with a carotid stent place 02/07/2025.     - cont ASA + brilinta (unclear home compliance)  - cont atorvastatin       Antithrombotics for secondary stroke prevention: Antiplatelets: Aspirin: 81 mg daily + Brilinta BID     Statins for secondary stroke prevention and hyperlipidemia, if present:   Statins: Atorvastatin- 40 mg daily    Aggressive risk factor modification: HTN, Smoking, HLD, Exercise, CAD     Rehab efforts: The patient has been evaluated by a stroke team provider and the therapy needs have been fully considered based off the presenting complaints and exam findings. The following therapy evaluations are needed: PT evaluate and treat    Diagnostics ordered/pending: Other: patient's work up has already been done at [facility]     VTE prophylaxis: Heparin 5000 units SQ every 8 hours    BP parameters:  BP < 140/90 goal, no strict BP parameters       Substance use  Tobacco use 1/2 pack day. Cocaine use Several times a week.     - nicotine patch         Urinary incontinence  Episode of urinary incontinence during chest pain. Denies dysuria.     - UA ordered  - will monitor for recurrence     ACP (advance care planning)  Discussed with patient code status on admit. Patient would prefer to be DNR.     Hypokalemia  Patient's most recent potassium results are listed below.   Recent Labs     03/17/25  0506 03/17/25  1142 03/18/25  0424   K 2.9* 3.0* 3.8     Plan  - Replete potassium per protocol  - Monitor potassium Daily  - Patient's hypokalemia is stable  Final Active Diagnoses:     Diagnosis Date Noted POA    PRINCIPAL PROBLEM:  Chest pain [R07.9] 03/16/2025 Yes    ACP (advance care planning) [Z71.89] 03/17/2025 Not Applicable    Hypokalemia [E87.6] 03/17/2025 No    HTN (hypertension) [I10] 03/16/2025 Yes    Cerebrovascular accident (CVA) due to stenosis of right carotid artery [I63.231] 03/16/2025 Yes    Substance use [F19.90] 03/16/2025 Yes    Urinary incontinence [R32] 03/16/2025 Unknown      Problems Resolved During this Admission:       Discharged Condition: fair    Disposition: Home or Self Care    Follow Up:    Patient Instructions:      Notify your health care provider if you experience any of the following:     Notify your health care provider if you experience any of the following:  increased confusion or weakness     Notify your health care provider if you experience any of the following:  persistent dizziness, light-headedness, or visual disturbances     Notify your health care provider if you experience any of the following:  worsening rash     Notify your health care provider if you experience any of the following:  severe persistent headache     Notify your health care provider if you experience any of the following:  difficulty breathing or increased cough     Notify your health care provider if you experience any of the following:  redness, tenderness, or signs of infection (pain, swelling, redness, odor or green/yellow discharge around incision site)     Notify your health care provider if you experience any of the following:  severe uncontrolled pain     Notify your health care provider if you experience any of the following:  persistent nausea and vomiting or diarrhea     Notify your health care provider if you experience any of the following:  temperature >100.4       Significant Diagnostic Studies: N/A    Pending Diagnostic Studies:       Procedure Component Value Units Date/Time    CBC Auto Differential [[id number]]     Order Status: Sent Lab Status: No result      Specimen: Blood     Comprehensive Metabolic Panel [[id number]]     Order Status: Sent Lab Status: No result     Specimen: Blood     Magnesium [[id number]]     Order Status: Sent Lab Status: No result     Specimen: Blood     Phosphorus [[id number]]     Order Status: Sent Lab Status: No result     Specimen: Blood            Medications:  Reconciled Home Medications:      Medication List        START taking these medications      acetaminophen 325 MG tablet  Commonly known as: TYLENOL  Take 2 tablets (650 mg total) by mouth every 8 (eight) hours as needed.            CHANGE how you take these medications      amLODIPine-benazepriL 10-40 mg per capsule  Commonly known as: LOTREL  Take 1 capsule by mouth once daily.  What changed:   medication strength  how much to take  when to take this  additional instructions            CONTINUE taking these medications      butalbital-acetaminophen-caffeine -40 mg -40 mg per tablet  Commonly known as: FIORICET, ESGIC  Take 1-2 tablets by mouth every 6 (six) hours as needed for Headaches.     carisoprodoL 350 MG tablet  Commonly known as: SOMA  Take 350 mg by mouth 4 (four) times daily as needed for Muscle spasms.     esomeprazole 40 MG capsule  Commonly known as: NEXIUM  Take 1 capsule (40 mg total) by mouth once daily.     gabapentin 600 MG tablet  Commonly known as: NEURONTIN  Take 600 mg by mouth 3 (three) times daily.     omeprazole 40 MG capsule  Commonly known as: PRILOSEC  Take 1 capsule (40 mg total) by mouth once daily.     oxyCODONE-acetaminophen  mg per tablet  Commonly known as: PERCOCET  Take 1 tablet by mouth every 4 (four) hours as needed for Pain.            STOP taking these medications      amLODIPine 5 MG tablet  Commonly known as: NORVASC              Indwelling Lines/Drains at time of discharge:   Lines/Drains/Airways       None                   Time spent on the discharge of patient: 35 minutes     Pt deemed appropriate for discharge. Plan  discussed with pt, who was agreeable and amenable; medications were discussed and reviewed, outpatient follow-up scheduled, ER precautions were given, all questions were answered to the pt's satisfaction, and Ms Mercado was subsequently discharged.      Torin Rosales DO  Department of Hospital Medicine  [facility] 11[facility]

## 2025-03-19 NOTE — ASSESSMENT & PLAN NOTE
Hx of left sided thalamic stroke with residual right sided deficits. Had more recent  right carotid stenosis induced stroke in 02/2025 with a carotid stent place 02/07/2025.     - cont ASA + brilinta (unclear home compliance)  - cont atorvastatin       Antithrombotics for secondary stroke prevention: Antiplatelets: Aspirin: 81 mg daily + Brilinta BID     Statins for secondary stroke prevention and hyperlipidemia, if present:   Statins: Atorvastatin- 40 mg daily    Aggressive risk factor modification: HTN, Smoking, HLD, Exercise, CAD     Rehab efforts: The patient has been evaluated by a stroke team provider and the therapy needs have been fully considered based off the presenting complaints and exam findings. The following therapy evaluations are needed: PT evaluate and treat    Diagnostics ordered/pending: Other: patient's work up has already been done at [facility]     VTE prophylaxis: Heparin 5000 units SQ every 8 hours    BP parameters: BP < 140/90 goal, no strict BP parameters

## 2025-03-19 NOTE — PLAN OF CARE
[facility] 11[facility]  Discharge Final Note    Primary Care Provider: No, Primary Doctor    Expected Discharge Date: 3/19/2025    Patient discharged to home via personal transportation.     Patient's bedside nurse and patient notified of the above.    Discharge Plan A and Plan B have been determined by review of patient's clinical status, future medical and therapeutic needs, and coverage/benefits for post-acute care in coordination with multidisciplinary team members.        Final Discharge Note (most recent)       Final Note - 03/19/25 1600          Final Note    Assessment Type Final Discharge Note (P)      Anticipated Discharge Disposition Home or Self Care (P)         Post-Acute Status    Post-Acute Authorization Other (P)      Other Status No Post-Acute Service Needs (P)                      Important Message from Medicare                 Future Appointments   Date Time Provider Department Center   3/27/2025  1:20 PM Kye Wen MD [facility]-up appointment and information added to AVS.     Dawna Sung LMSW  [facility]  Ext. [phone]

## 2025-03-19 NOTE — PLAN OF CARE
Problem: Adult Inpatient Plan of Care  Goal: Plan of Care Review  3/19/2025 1527 by Luis Fernando Smith RN  Outcome: Met  3/19/2025 0919 by Luis Fernando Smith RN  Outcome: Progressing  Goal: Patient-Specific Goal (Individualized)  3/19/2025 1527 by Luis Fernando Smith RN  Outcome: Met  3/19/2025 0919 by Luis Fernando Smith RN  Outcome: Progressing  Goal: Absence of Hospital-Acquired Illness or Injury  3/19/2025 1527 by Luis Fernando Smith RN  Outcome: Met  3/19/2025 0919 by Luis Fernando Smith RN  Outcome: Progressing  Goal: Optimal Comfort and Wellbeing  3/19/2025 1527 by Luis Fernando Smith RN  Outcome: Met  3/19/2025 0919 by Luis Fernando Smith RN  Outcome: Progressing  Goal: Readiness for Transition of Care  3/19/2025 1527 by Luis Fernando Smith RN  Outcome: Met  3/19/2025 0919 by Luis Fernando Smith RN  Outcome: Progressing     Problem: Infection  Goal: Absence of Infection Signs and Symptoms  3/19/2025 1527 by Luis Fernando Smith RN  Outcome: Met  3/19/2025 0919 by Luis Fernando Smith RN  Outcome: Progressing     Problem: Skin Injury Risk Increased  Goal: Skin Health and Integrity  3/19/2025 1527 by Luis Fernando Smith RN  Outcome: Met  3/19/2025 0919 by Luis Fernando Smith RN  Outcome: Progressing     Problem: Fall Injury Risk  Goal: Absence of Fall and Fall-Related Injury  3/19/2025 1527 by Luis Fernando Smith RN  Outcome: Met  3/19/2025 0919 by Luis Fernando Smith RN  Outcome: Progressing   Saline lock removed and discharge instructions given , patient has d/c home meds , family at bedside

## 2025-03-19 NOTE — ASSESSMENT & PLAN NOTE
Patient's blood pressure range in the last 24 hours was: BP  Min: 130/81  Max: 185/74.The patient's inpatient anti-hypertensive regimen is listed below:  Current Antihypertensives  losartan tablet 100 mg, Daily, Oral  amLODIPine tablet 10 mg, Daily, Oral  hydrALAZINE tablet 25 mg, Every 8 hours PRN, Oral  , Daily, Oral    Plan  - BP is uncontrolled, will adjust as follows: will restart home medications, unclear compliance   -Hydralazine prn

## 2025-04-10 ENCOUNTER — HOSPITAL ENCOUNTER (EMERGENCY)
Facility: HOSPITAL | Age: 60
Discharge: HOME | End: 2025-04-10
Attending: EMERGENCY MEDICINE
Payer: MEDICAID

## 2025-04-10 VITALS
RESPIRATION RATE: 18 BRPM | DIASTOLIC BLOOD PRESSURE: 82 MMHG | SYSTOLIC BLOOD PRESSURE: 186 MMHG | BODY MASS INDEX: 39.15 KG/M2 | WEIGHT: 235 LBS | HEART RATE: 57 BPM | OXYGEN SATURATION: 98 % | HEIGHT: 65 IN | TEMPERATURE: 98 F

## 2025-04-10 DIAGNOSIS — R07.9 CHEST PAIN: ICD-10-CM

## 2025-04-10 DIAGNOSIS — R06.02 SHORTNESS OF BREATH: ICD-10-CM

## 2025-04-10 DIAGNOSIS — R07.89 CHEST DISCOMFORT: ICD-10-CM

## 2025-04-10 DIAGNOSIS — F14.10 COCAINE ABUSE: Primary | ICD-10-CM

## 2025-04-10 LAB
ABSOLUTE EOSINOPHIL (OHS): 0.21 K/UL
ABSOLUTE MONOCYTE (OHS): 0.36 K/UL (ref 0.3–1)
ABSOLUTE NEUTROPHIL COUNT (OHS): 3.1 K/UL (ref 1.8–7.7)
ALBUMIN SERPL BCP-MCNC: 3.2 G/DL (ref 3.5–5.2)
ALP SERPL-CCNC: 87 UNIT/L (ref 40–150)
ALT SERPL W/O P-5'-P-CCNC: 10 UNIT/L (ref 10–44)
ANION GAP (OHS): 8 MMOL/L (ref 8–16)
AST SERPL-CCNC: 21 UNIT/L (ref 11–45)
BASOPHILS # BLD AUTO: 0.03 K/UL
BASOPHILS NFR BLD AUTO: 0.6 %
BILIRUB SERPL-MCNC: 0.4 MG/DL (ref 0.1–1)
BNP SERPL-MCNC: 104 PG/ML (ref 0–99)
BUN SERPL-MCNC: 13 MG/DL (ref 6–20)
CALCIUM SERPL-MCNC: 8.7 MG/DL (ref 8.7–10.5)
CHLORIDE SERPL-SCNC: 112 MMOL/L (ref 95–110)
CO2 SERPL-SCNC: 23 MMOL/L (ref 23–29)
CREAT SERPL-MCNC: 0.8 MG/DL (ref 0.5–1.4)
ERYTHROCYTE [DISTWIDTH] IN BLOOD BY AUTOMATED COUNT: 13.9 % (ref 11.5–14.5)
GFR SERPLBLD CREATININE-BSD FMLA CKD-EPI: >60 ML/MIN/1.73/M2
GLUCOSE SERPL-MCNC: 96 MG/DL (ref 70–110)
HCT VFR BLD AUTO: 34.4 % (ref 37–48.5)
HCV AB SERPL QL IA: REACTIVE
HGB BLD-MCNC: 10.9 GM/DL (ref 12–16)
HIV 1+2 AB+HIV1 P24 AG SERPL QL IA: NORMAL
IMM GRANULOCYTES # BLD AUTO: 0.02 K/UL (ref 0–0.04)
IMM GRANULOCYTES NFR BLD AUTO: 0.4 % (ref 0–0.5)
LYMPHOCYTES # BLD AUTO: 1.5 K/UL (ref 1–4.8)
MAGNESIUM SERPL-MCNC: 1.9 MG/DL (ref 1.6–2.6)
MCH RBC QN AUTO: 26.7 PG (ref 27–31)
MCHC RBC AUTO-ENTMCNC: 31.7 G/DL (ref 32–36)
MCV RBC AUTO: 84 FL (ref 82–98)
NUCLEATED RBC (/100WBC) (OHS): 0 /100 WBC
OHS QRS DURATION: 94 MS
OHS QRS DURATION: 96 MS
OHS QTC CALCULATION: 439 MS
OHS QTC CALCULATION: 457 MS
PLATELET # BLD AUTO: 255 K/UL (ref 150–450)
PMV BLD AUTO: 10.2 FL (ref 9.2–12.9)
POTASSIUM SERPL-SCNC: 3.9 MMOL/L (ref 3.5–5.1)
PROT SERPL-MCNC: 7.4 GM/DL (ref 6–8.4)
RBC # BLD AUTO: 4.09 M/UL (ref 4–5.4)
RELATIVE EOSINOPHIL (OHS): 4 %
RELATIVE LYMPHOCYTE (OHS): 28.7 % (ref 18–48)
RELATIVE MONOCYTE (OHS): 6.9 % (ref 4–15)
RELATIVE NEUTROPHIL (OHS): 59.4 % (ref 38–73)
SODIUM SERPL-SCNC: 143 MMOL/L (ref 136–145)
TROPONIN I SERPL HS-MCNC: 3 NG/L
TROPONIN I SERPL HS-MCNC: <3 NG/L
WBC # BLD AUTO: 5.22 K/UL (ref 3.9–12.7)

## 2025-04-10 PROCEDURE — 87389 HIV-1 AG W/HIV-1&-2 AB AG IA: CPT | Performed by: PHYSICIAN ASSISTANT

## 2025-04-10 PROCEDURE — 93005 ELECTROCARDIOGRAM TRACING: CPT

## 2025-04-10 PROCEDURE — 80047 BASIC METABLC PNL IONIZED CA: CPT

## 2025-04-10 PROCEDURE — 85025 COMPLETE CBC W/AUTO DIFF WBC: CPT | Performed by: STUDENT IN AN ORGANIZED HEALTH CARE EDUCATION/TRAINING PROGRAM

## 2025-04-10 PROCEDURE — 63600175 PHARM REV CODE 636 W HCPCS: Performed by: STUDENT IN AN ORGANIZED HEALTH CARE EDUCATION/TRAINING PROGRAM

## 2025-04-10 PROCEDURE — 82374 ASSAY BLOOD CARBON DIOXIDE: CPT | Performed by: STUDENT IN AN ORGANIZED HEALTH CARE EDUCATION/TRAINING PROGRAM

## 2025-04-10 PROCEDURE — 99285 EMERGENCY DEPT VISIT HI MDM: CPT | Mod: 25

## 2025-04-10 PROCEDURE — 96375 TX/PRO/DX INJ NEW DRUG ADDON: CPT

## 2025-04-10 PROCEDURE — 87522 HEPATITIS C REVRS TRNSCRPJ: CPT | Performed by: PHYSICIAN ASSISTANT

## 2025-04-10 PROCEDURE — 83735 ASSAY OF MAGNESIUM: CPT | Performed by: STUDENT IN AN ORGANIZED HEALTH CARE EDUCATION/TRAINING PROGRAM

## 2025-04-10 PROCEDURE — 25000003 PHARM REV CODE 250: Performed by: STUDENT IN AN ORGANIZED HEALTH CARE EDUCATION/TRAINING PROGRAM

## 2025-04-10 PROCEDURE — 93010 ELECTROCARDIOGRAM REPORT: CPT | Mod: ,,, | Performed by: STUDENT IN AN ORGANIZED HEALTH CARE EDUCATION/TRAINING PROGRAM

## 2025-04-10 PROCEDURE — 83880 ASSAY OF NATRIURETIC PEPTIDE: CPT | Performed by: STUDENT IN AN ORGANIZED HEALTH CARE EDUCATION/TRAINING PROGRAM

## 2025-04-10 PROCEDURE — 96374 THER/PROPH/DIAG INJ IV PUSH: CPT

## 2025-04-10 PROCEDURE — 84484 ASSAY OF TROPONIN QUANT: CPT | Performed by: STUDENT IN AN ORGANIZED HEALTH CARE EDUCATION/TRAINING PROGRAM

## 2025-04-10 PROCEDURE — 86803 HEPATITIS C AB TEST: CPT | Performed by: PHYSICIAN ASSISTANT

## 2025-04-10 PROCEDURE — 93010 ELECTROCARDIOGRAM REPORT: CPT | Mod: ,,, | Performed by: INTERNAL MEDICINE

## 2025-04-10 PROCEDURE — 82330 ASSAY OF CALCIUM: CPT | Mod: 59

## 2025-04-10 RX ORDER — AMLODIPINE BESYLATE 10 MG/1
10 TABLET ORAL
Status: COMPLETED | OUTPATIENT
Start: 2025-04-10 | End: 2025-04-10

## 2025-04-10 RX ORDER — LISINOPRIL 20 MG/1
40 TABLET ORAL
Status: COMPLETED | OUTPATIENT
Start: 2025-04-10 | End: 2025-04-10

## 2025-04-10 RX ORDER — ASPIRIN 325 MG
325 TABLET ORAL
Status: COMPLETED | OUTPATIENT
Start: 2025-04-10 | End: 2025-04-10

## 2025-04-10 RX ORDER — MORPHINE SULFATE 4 MG/ML
4 INJECTION, SOLUTION INTRAMUSCULAR; INTRAVENOUS
Refills: 0 | Status: COMPLETED | OUTPATIENT
Start: 2025-04-10 | End: 2025-04-10

## 2025-04-10 RX ORDER — ACETAMINOPHEN 325 MG/1
650 TABLET ORAL
Status: COMPLETED | OUTPATIENT
Start: 2025-04-10 | End: 2025-04-10

## 2025-04-10 RX ORDER — HYDRALAZINE HYDROCHLORIDE 20 MG/ML
10 INJECTION INTRAMUSCULAR; INTRAVENOUS
Status: COMPLETED | OUTPATIENT
Start: 2025-04-10 | End: 2025-04-10

## 2025-04-10 RX ORDER — AMLODIPINE AND BENAZEPRIL HYDROCHLORIDE 10; 40 MG/1; MG/1
1 CAPSULE ORAL DAILY
Qty: 90 CAPSULE | Refills: 3 | Status: SHIPPED | OUTPATIENT
Start: 2025-04-10 | End: 2026-04-10

## 2025-04-10 RX ADMIN — AMLODIPINE BESYLATE 10 MG: 10 TABLET ORAL at 10:04

## 2025-04-10 RX ADMIN — ACETAMINOPHEN 650 MG: 325 TABLET ORAL at 02:04

## 2025-04-10 RX ADMIN — LISINOPRIL 40 MG: 20 TABLET ORAL at 10:04

## 2025-04-10 RX ADMIN — HYDRALAZINE HYDROCHLORIDE 10 MG: 20 INJECTION, SOLUTION INTRAMUSCULAR; INTRAVENOUS at 02:04

## 2025-04-10 RX ADMIN — MORPHINE SULFATE 4 MG: 4 INJECTION INTRAVENOUS at 10:04

## 2025-04-10 RX ADMIN — ASPIRIN 325 MG ORAL TABLET 325 MG: 325 PILL ORAL at 10:04

## 2025-04-10 NOTE — DISCHARGE INSTRUCTIONS
Follow up with your cardiologist for your chest pain.  Return to the ER if your chest pain returns.  Stopped taking cocaine.  This can raise her blood pressure and make her chest pain come back.  This can also permanently damaged your heart.

## 2025-04-10 NOTE — ED PROVIDER NOTES
Encounter Date: 4/10/2025       History     Chief Complaint   Patient presents with    Chest Pain    Shortness of Breath     + stent     HPI    59-year-old male with history of hypertension, asthma, stroke, CVA, hypokalemia, presenting for shortness of breath.    Patient reports 8/10 substernal, constant, sharp chest pain.  She has had chest pain for the last day, she also has shortness of breath which she reports for the past several days.  She reports her shortness of breath happens when she is talking, and when she lays flat.  but not while she is walking around.  She also reports cough for the last several days in addition to using cocaine 3-4 days ago.  She denies fever, chills, nausea, vomiting, abdominal pain.  She took her blood pressure medication yesterday but did not take it today because she did not feel well.      On chart review, patient was recently denied transferred to the heart failure clinic due to no heart failure diagnosis, echo normal, diastolic function normal, no need for diuresis.  She was recently admitted 2025 through 2025.  She was admitted for high-risk chest pain, drank alcohol, recent cocaine use.  Had a carotid stent placed on 2025 and is on Brilinta, Plavix, statin.      Review of patient's allergies indicates:  No Known Allergies  Past Medical History:   Diagnosis Date    Arthritis     Asthma     as child    Cerebrovascular accident (CVA) due to stenosis of right carotid artery 3/16/2025    CVA (cerebral vascular accident)     Hyperlipemia     Hypertension      Past Surgical History:   Procedure Laterality Date     SECTION, CLASSIC       Family History   Problem Relation Name Age of Onset    Stroke Mother      Hypertension Mother      Heart attack Father       Social History[1]  Review of Systems  See HPI for pertinent ROS.   Physical Exam     Initial Vitals [04/10/25 1006]   BP Pulse Resp Temp SpO2   (!) 196/96 (!) 57 20 98.3 °F (36.8 °C) 96 %      MAP        --         Physical Exam    Constitutional: She appears well-developed and well-nourished.   HENT:   Head: Normocephalic and atraumatic.   Eyes: Conjunctivae are normal. No scleral icterus.   Neck: No JVD present.   Cardiovascular:  Regular rhythm and normal heart sounds.     Exam reveals no gallop and no friction rub.       No murmur heard.  Intermittent bradycardia   Pulmonary/Chest: Breath sounds normal. No stridor. She has no wheezes. She has no rhonchi. She has no rales.   Abdominal: Abdomen is soft. There is no abdominal tenderness. There is no rebound and no guarding.   Musculoskeletal:         General: No tenderness or edema.     Neurological: She is alert.   Skin: Skin is warm. Capillary refill takes less than 2 seconds.   Psychiatric: She has a normal mood and affect.         ED Course   Procedures  Labs Reviewed   HEPATITIS C ANTIBODY - Abnormal       Result Value    Hep C Ab Interp Reactive (*)    COMPREHENSIVE METABOLIC PANEL - Abnormal    Sodium 143      Potassium 3.9      Chloride 112 (*)     CO2 23      Glucose 96      BUN 13      Creatinine 0.8      Calcium 8.7      Protein Total 7.4      Albumin 3.2 (*)     Bilirubin Total 0.4      ALP 87      AST 21      ALT 10      Anion Gap 8      eGFR >60     B-TYPE NATRIURETIC PEPTIDE - Abnormal     (*)    CBC WITH DIFFERENTIAL - Abnormal    WBC 5.22      RBC 4.09      HGB 10.9 (*)     HCT 34.4 (*)     MCV 84      MCH 26.7 (*)     MCHC 31.7 (*)     RDW 13.9      Platelet Count 255      MPV 10.2      Nucleated RBC 0      Neut % 59.4      Lymph % 28.7      Mono % 6.9      Eos % 4.0      Basophil % 0.6      Imm Grans % 0.4      Neut # 3.10      Lymph # 1.50      Mono # 0.36      Eos # 0.21      Baso # 0.03      Imm Grans # 0.02     HIV 1 / 2 ANTIBODY - Normal    HIV 1/2 Ag/Ab Non-Reactive     TROPONIN I HIGH SENSITIVITY - Normal    Troponin High Sensitive 3     TROPONIN I HIGH SENSITIVITY - Normal    Troponin High Sensitive <3     MAGNESIUM - Normal     Magnesium  1.9     CBC W/ AUTO DIFFERENTIAL    Narrative:     The following orders were created for panel order CBC auto differential.  Procedure                               Abnormality         Status                     ---------                               -----------         ------                     CBC with Differential[[id number]]       Abnormal            Final result                 Please view results for these tests on the individual orders.   HEPATITIS C RNA, QUANTITATIVE, PCR        ECG Results              EKG 12-lead (Final result)        Collection Time Result Time QRS Duration OHS QTC Calculation    04/10/25 10:27:17 04/10/25 11:58:19 96 439                     Final result by Interface, Lab In [facility] (04/10/25 11:58:27)                   Narrative:    Test Reason : R07.9,    Vent. Rate :  57 BPM     Atrial Rate :  57 BPM     P-R Int : 180 ms          QRS Dur :  96 ms      QT Int : 452 ms       P-R-T Axes :  92  93  87 degrees    QTcB Int : 439 ms     Suspect arm lead reversal, interpretation assumes no reversal  Sinus bradycardia  Incomplete right bundle branch block  Possible Right ventricular hypertrophy  Voltage criteria for left ventricular hypertrophy  Nonspecific ST abnormality  Abnormal ECG  When compared with ECG of 10-Apr-2025 10:12,  Incomplete right bundle branch block is now Present  Nonspecific T wave abnormality, improved in Lateral leads  Confirmed by Shameka Valdez ([id number]) on 4/10/2025 11:58:14 AM    Referred By: AAAREFERRAL SELF           Confirmed By: Shameka Valdez                                     EKG 12-lead (Final result)        Collection Time Result Time QRS Duration OHS QTC Calculation    04/10/25 10:12:38 04/10/25 10:28:46 94 457                     Final result by Interface, Lab In [facility] (04/10/25 10:28:50)                   Narrative:    Test Reason : R07.89,    Vent. Rate :  65 BPM     Atrial Rate :  65 BPM     P-R Int : 164 ms          QRS Dur :  94 ms       QT Int : 440 ms       P-R-T Axes :  58  19  67 degrees    QTcB Int : 457 ms    Normal sinus rhythm  Possible Left atrial enlargement  LVH ( R in aVL , Sokolow-Mcneill , Romhilt-Rodriguez )  Nonspecific ST and T wave abnormality  Abnormal ECG  When compared with ECG of 19-Mar-2025 07:28,  No significant change was found  Confirmed by Jamie Harris ([id number]) on 4/10/2025 10:28:41 AM    Referred By:            Confirmed By: Jamie Harris                                  Imaging Results              X-Ray Chest 1 View (Final result)  Result time 04/10/25 11:26:58      Final result by Felicita Renteria MD (04/10/25 11:26:58)                   Impression:      Scarring versus platelike atelectasis and possible interstitial thickening in the right mid and lower lung zones.    Electronically signed by resident: Patrick Fernandez  Date:    04/10/2025  Time:    10:46    Electronically signed by: Felicita Renteria  Date:    04/10/2025  Time:    11:26               Narrative:    EXAMINATION:  XR CHEST 1 VIEW    CLINICAL HISTORY:  Shortness of breath    TECHNIQUE:  Single frontal portable view of the chest was performed.    COMPARISON:  X-ray 03/16/2025, and CTA 03/16/2025    FINDINGS:  There is accentuation of the interstitial markings at the right lung base along with bandlike linear opacities predominantly in the right mid and lower lung zones.  No new large focal consolidation.  No significant pleural effusion. No pneumothorax.    The cardiomediastinal silhouette is normal in size. No acute osseous abnormality.                                       Medications   amLODIPine tablet 10 mg (10 mg Oral Given 4/10/25 1044)   lisinopriL tablet 40 mg (40 mg Oral Given 4/10/25 1044)   aspirin tablet 325 mg (325 mg Oral Given 4/10/25 1044)   morphine injection 4 mg (4 mg Intravenous Given 4/10/25 1044)   hydrALAZINE injection 10 mg (10 mg Intravenous Given 4/10/25 1404)   acetaminophen tablet 650 mg (650 mg Oral Given 4/10/25 1413)      Medical Decision Making  Amount and/or Complexity of Data Reviewed  Labs: ordered. Decision-making details documented in ED Course.  Radiology: ordered.    Risk  OTC drugs.  Prescription drug management.               ED Course as of 04/10/25 1701   Thu Apr 10, 2025   1112 CBC auto differential(!)  No evidence of leukocytosis, acute anemia requiring transfusion or thrombocytopenia.   [KB]   1350 Troponin I High Sensitivity #2 [KB]      ED Course User Index  [KB] Elvira Mata MD                       EKG, sinus bradycardia, rate 57, incomplete right bundle-branch block, no QTC prolongation or ST segment elevation equivalent.      59-year-old female with past medical history of polysubstance abuse, hypertension, presenting for chest pain.  On arrival, patient is significantly hypertensive with mild bradycardia..  Lungs are clear to auscultation, physical exam otherwise reassuring.  Labs overall reassuring, high sensitivity troponin negative x2 making ACS less likely.  BNP only mildly elevated, not in fulminant CHF exacerbation.  No leukocytosis, no acute anemia or thrombocytopenia.  No ANDREWS, no significant electrolyte abnormalities.  She was given Tylenol, morphine for chest pain with resolution of her chest pain.  She was given 10 mg of amlodipine and 40 mg lisinopril for her elevated blood pressure in the emergency department.  She was also given 325 of aspirin on arrival.  Her blood pressure mildly improved, and then she was given 10 mg IV hydralazine with improvement of her blood pressure.  She was discharged, counseled strongly against using cocaine in the future, and given strict return to ER precautions.  Her home antihypertensive medications were refilled to home pharmacy.      Clinical Impression:  Final diagnoses:  [R07.89] Chest discomfort  [R07.9] Chest pain  [R06.02] Shortness of breath  [F14.10] Cocaine abuse (Primary)          ED Disposition Condition    Discharge Stable          ED Prescriptions        Medication Sig Dispense Start Date End Date Auth. Provider    amLODIPine-benazepriL (LOTREL) 10-40 mg per capsule Take 1 capsule by mouth once daily. 90 capsule 4/10/2025 4/10/2026 Elvira Mata MD          Follow-up Information       Follow up With Specialties Details Why Contact Info Additional Information    [facility] - [facility]iology - 3rd Fl Cardiology Schedule an appointment as soon as possible for a visit   [address] [facility] [id number] [phone] Cardiology Services Clinics - 3rd floor    [facility] - Emergency Dept Emergency Medicine Go to  If symptoms worsen [address] [facility] [id number] [phone]                  [1]   Social History  Tobacco Use    Smoking status: Some Days     Current packs/day: 0.50     Types: Cigarettes    Smokeless tobacco: Never   Substance Use Topics    Alcohol use: Yes     Comment: occas, none recently    Drug use: Yes     Types: Cocaine     Comment: smoke crack cocaine 4 days PTA        Elvira Mata MD  Resident  04/10/25 1050

## 2025-04-10 NOTE — ED TRIAGE NOTES
Tamiko Mercado, a 59 y.o. female presents to the ED w/ complaint of chest pain, hard to swallow, SOB, comes and goes    Triage note:  Chief Complaint   Patient presents with    Chest Pain    Shortness of Breath     + stent     Review of patient's allergies indicates:  No Known Allergies        APPEARANCE: awake and alert in NAD. PAIN  10/10  SKIN: warm, dry and intact. No breakdown or bruising.  MUSCULOSKELETAL: Patient moving all extremities spontaneously, no obvious swelling or deformities noted. Ambulates independently.  RESPIRATORY: endorses shortness of breath.Respirations unlabored.   CARDIAC: endorses CP, 2+ distal pulses; no peripheral edema  ABDOMEN: S/ND/NT, Denies nausea  : voids spontaneously, denies difficulty  Neurologic: AAO x 4; follows commands equal strength in all extremities; denies numbness/tingling. Denies dizziness

## 2025-04-11 LAB
BUN SERPL-MCNC: 21 MG/DL (ref 6–30)
CHLORIDE SERPL-SCNC: 109 MMOL/L (ref 95–110)
CREAT SERPL-MCNC: 1.8 MG/DL (ref 0.5–1.4)
GLUCOSE SERPL-MCNC: 113 MG/DL (ref 70–110)
HCT VFR BLD CALC: 40 %PCV (ref 36–54)
HCV RNA SERPL NAA+PROBE-LOG IU: NOT DETECTED {LOG_IU}/ML
POC IONIZED CALCIUM: 1.15 MMOL/L (ref 1.06–1.42)
POC TCO2 (MEASURED): 18 MMOL/L (ref 23–29)
POTASSIUM BLD-SCNC: 3.6 MMOL/L (ref 3.5–5.1)
SAMPLE: ABNORMAL
SODIUM BLD-SCNC: 141 MMOL/L (ref 136–145)

## 2025-05-18 ENCOUNTER — HOSPITAL ENCOUNTER (EMERGENCY)
Facility: HOSPITAL | Age: 60
Discharge: HOME | End: 2025-05-19
Attending: EMERGENCY MEDICINE
Payer: MEDICAID

## 2025-05-18 DIAGNOSIS — R07.9 CHEST PAIN: ICD-10-CM

## 2025-05-18 DIAGNOSIS — I10 HYPERTENSION, UNSPECIFIED TYPE: Primary | ICD-10-CM

## 2025-05-18 DIAGNOSIS — R10.13 EPIGASTRIC ABDOMINAL PAIN: ICD-10-CM

## 2025-05-18 LAB
ABSOLUTE EOSINOPHIL (OHS): 0.04 K/UL
ABSOLUTE EOSINOPHIL (OHS): 0.07 K/UL
ABSOLUTE MONOCYTE (OHS): 0.45 K/UL (ref 0.3–1)
ABSOLUTE MONOCYTE (OHS): 0.47 K/UL (ref 0.3–1)
ABSOLUTE NEUTROPHIL COUNT (OHS): 4.9 K/UL (ref 1.8–7.7)
ABSOLUTE NEUTROPHIL COUNT (OHS): 4.95 K/UL (ref 1.8–7.7)
ALBUMIN SERPL BCP-MCNC: 3.1 G/DL (ref 3.5–5.2)
ALP SERPL-CCNC: 73 UNIT/L (ref 40–150)
ALT SERPL W/O P-5'-P-CCNC: 7 UNIT/L (ref 10–44)
ANION GAP (OHS): 11 MMOL/L (ref 8–16)
AST SERPL-CCNC: 10 UNIT/L (ref 11–45)
BASOPHILS # BLD AUTO: 0.02 K/UL
BASOPHILS # BLD AUTO: 0.03 K/UL
BASOPHILS NFR BLD AUTO: 0.3 %
BASOPHILS NFR BLD AUTO: 0.4 %
BILIRUB SERPL-MCNC: 0.5 MG/DL (ref 0.1–1)
BNP SERPL-MCNC: 90 PG/ML (ref 0–99)
BUN SERPL-MCNC: 16 MG/DL (ref 6–20)
CALCIUM SERPL-MCNC: 8.7 MG/DL (ref 8.7–10.5)
CHLORIDE SERPL-SCNC: 106 MMOL/L (ref 95–110)
CO2 SERPL-SCNC: 22 MMOL/L (ref 23–29)
CREAT SERPL-MCNC: 0.7 MG/DL (ref 0.5–1.4)
ERYTHROCYTE [DISTWIDTH] IN BLOOD BY AUTOMATED COUNT: 13.4 % (ref 11.5–14.5)
ERYTHROCYTE [DISTWIDTH] IN BLOOD BY AUTOMATED COUNT: 13.4 % (ref 11.5–14.5)
GFR SERPLBLD CREATININE-BSD FMLA CKD-EPI: >60 ML/MIN/1.73/M2
GLUCOSE SERPL-MCNC: 96 MG/DL (ref 70–110)
HCT VFR BLD AUTO: 34.5 % (ref 37–48.5)
HCT VFR BLD AUTO: 34.6 % (ref 37–48.5)
HGB BLD-MCNC: 11 GM/DL (ref 12–16)
HGB BLD-MCNC: 11.1 GM/DL (ref 12–16)
IMM GRANULOCYTES # BLD AUTO: 0.01 K/UL (ref 0–0.04)
IMM GRANULOCYTES # BLD AUTO: 0.02 K/UL (ref 0–0.04)
IMM GRANULOCYTES NFR BLD AUTO: 0.1 % (ref 0–0.5)
IMM GRANULOCYTES NFR BLD AUTO: 0.3 % (ref 0–0.5)
LYMPHOCYTES # BLD AUTO: 1.5 K/UL (ref 1–4.8)
LYMPHOCYTES # BLD AUTO: 1.62 K/UL (ref 1–4.8)
MAGNESIUM SERPL-MCNC: 1.8 MG/DL (ref 1.6–2.6)
MCH RBC QN AUTO: 26 PG (ref 27–31)
MCH RBC QN AUTO: 26.1 PG (ref 27–31)
MCHC RBC AUTO-ENTMCNC: 31.9 G/DL (ref 32–36)
MCHC RBC AUTO-ENTMCNC: 32.1 G/DL (ref 32–36)
MCV RBC AUTO: 81 FL (ref 82–98)
MCV RBC AUTO: 82 FL (ref 82–98)
NUCLEATED RBC (/100WBC) (OHS): 0 /100 WBC
NUCLEATED RBC (/100WBC) (OHS): 0 /100 WBC
PLATELET # BLD AUTO: 291 K/UL (ref 150–450)
PLATELET # BLD AUTO: 296 K/UL (ref 150–450)
PMV BLD AUTO: 9.7 FL (ref 9.2–12.9)
PMV BLD AUTO: 9.8 FL (ref 9.2–12.9)
POTASSIUM SERPL-SCNC: 3.2 MMOL/L (ref 3.5–5.1)
PROT SERPL-MCNC: 6.8 GM/DL (ref 6–8.4)
RBC # BLD AUTO: 4.23 M/UL (ref 4–5.4)
RBC # BLD AUTO: 4.25 M/UL (ref 4–5.4)
RELATIVE EOSINOPHIL (OHS): 0.6 %
RELATIVE EOSINOPHIL (OHS): 1 %
RELATIVE LYMPHOCYTE (OHS): 21.5 % (ref 18–48)
RELATIVE LYMPHOCYTE (OHS): 22.8 % (ref 18–48)
RELATIVE MONOCYTE (OHS): 6.4 % (ref 4–15)
RELATIVE MONOCYTE (OHS): 6.6 % (ref 4–15)
RELATIVE NEUTROPHIL (OHS): 69.1 % (ref 38–73)
RELATIVE NEUTROPHIL (OHS): 70.9 % (ref 38–73)
SODIUM SERPL-SCNC: 139 MMOL/L (ref 136–145)
TROPONIN I SERPL HS-MCNC: 3 NG/L
TROPONIN I SERPL HS-MCNC: 4 NG/L
WBC # BLD AUTO: 6.98 K/UL (ref 3.9–12.7)
WBC # BLD AUTO: 7.1 K/UL (ref 3.9–12.7)

## 2025-05-18 PROCEDURE — 83880 ASSAY OF NATRIURETIC PEPTIDE: CPT | Performed by: EMERGENCY MEDICINE

## 2025-05-18 PROCEDURE — 96374 THER/PROPH/DIAG INJ IV PUSH: CPT

## 2025-05-18 PROCEDURE — 25000003 PHARM REV CODE 250: Performed by: EMERGENCY MEDICINE

## 2025-05-18 PROCEDURE — 93010 ELECTROCARDIOGRAM REPORT: CPT | Mod: ,,, | Performed by: INTERNAL MEDICINE

## 2025-05-18 PROCEDURE — 85025 COMPLETE CBC W/AUTO DIFF WBC: CPT | Performed by: EMERGENCY MEDICINE

## 2025-05-18 PROCEDURE — 93005 ELECTROCARDIOGRAM TRACING: CPT

## 2025-05-18 PROCEDURE — 99285 EMERGENCY DEPT VISIT HI MDM: CPT | Mod: 25

## 2025-05-18 PROCEDURE — 63600175 PHARM REV CODE 636 W HCPCS: Performed by: EMERGENCY MEDICINE

## 2025-05-18 PROCEDURE — 80053 COMPREHEN METABOLIC PANEL: CPT | Performed by: EMERGENCY MEDICINE

## 2025-05-18 PROCEDURE — 83735 ASSAY OF MAGNESIUM: CPT | Performed by: EMERGENCY MEDICINE

## 2025-05-18 PROCEDURE — 96375 TX/PRO/DX INJ NEW DRUG ADDON: CPT

## 2025-05-18 PROCEDURE — 84484 ASSAY OF TROPONIN QUANT: CPT | Performed by: EMERGENCY MEDICINE

## 2025-05-18 RX ORDER — LISINOPRIL 20 MG/1
40 TABLET ORAL
Status: COMPLETED | OUTPATIENT
Start: 2025-05-18 | End: 2025-05-18

## 2025-05-18 RX ORDER — AMLODIPINE BESYLATE 10 MG/1
10 TABLET ORAL
Status: COMPLETED | OUTPATIENT
Start: 2025-05-18 | End: 2025-05-18

## 2025-05-18 RX ORDER — ALUMINUM HYDROXIDE, MAGNESIUM HYDROXIDE, AND SIMETHICONE 1200; 120; 1200 MG/30ML; MG/30ML; MG/30ML
30 SUSPENSION ORAL
Status: COMPLETED | OUTPATIENT
Start: 2025-05-18 | End: 2025-05-18

## 2025-05-18 RX ORDER — HYDROMORPHONE HYDROCHLORIDE 1 MG/ML
1 INJECTION, SOLUTION INTRAMUSCULAR; INTRAVENOUS; SUBCUTANEOUS
Refills: 0 | Status: COMPLETED | OUTPATIENT
Start: 2025-05-18 | End: 2025-05-18

## 2025-05-18 RX ORDER — HYDRALAZINE HYDROCHLORIDE 20 MG/ML
20 INJECTION INTRAMUSCULAR; INTRAVENOUS
Status: COMPLETED | OUTPATIENT
Start: 2025-05-18 | End: 2025-05-18

## 2025-05-18 RX ORDER — METOCLOPRAMIDE HYDROCHLORIDE 5 MG/ML
10 INJECTION INTRAMUSCULAR; INTRAVENOUS
Status: COMPLETED | OUTPATIENT
Start: 2025-05-18 | End: 2025-05-18

## 2025-05-18 RX ORDER — MORPHINE SULFATE 4 MG/ML
4 INJECTION, SOLUTION INTRAMUSCULAR; INTRAVENOUS
Refills: 0 | Status: COMPLETED | OUTPATIENT
Start: 2025-05-18 | End: 2025-05-18

## 2025-05-18 RX ADMIN — HYDRALAZINE HYDROCHLORIDE 20 MG: 20 INJECTION, SOLUTION INTRAMUSCULAR; INTRAVENOUS at 06:05

## 2025-05-18 RX ADMIN — ALUMINUM HYDROXIDE, MAGNESIUM HYDROXIDE, AND SIMETHICONE 30 ML: 200; 200; 20 SUSPENSION ORAL at 03:05

## 2025-05-18 RX ADMIN — LISINOPRIL 40 MG: 20 TABLET ORAL at 04:05

## 2025-05-18 RX ADMIN — MORPHINE SULFATE 4 MG: 4 INJECTION INTRAVENOUS at 07:05

## 2025-05-18 RX ADMIN — METOCLOPRAMIDE 10 MG: 5 INJECTION, SOLUTION INTRAMUSCULAR; INTRAVENOUS at 07:05

## 2025-05-18 RX ADMIN — AMLODIPINE BESYLATE 10 MG: 10 TABLET ORAL at 04:05

## 2025-05-18 RX ADMIN — POTASSIUM BICARBONATE 40 MEQ: 391 TABLET, EFFERVESCENT ORAL at 10:05

## 2025-05-18 RX ADMIN — HYDROMORPHONE HYDROCHLORIDE 1 MG: 1 INJECTION, SOLUTION INTRAMUSCULAR; INTRAVENOUS; SUBCUTANEOUS at 08:05

## 2025-05-18 NOTE — ED TRIAGE NOTES
Tamiko Mercado, a 59 y.o. female presents to the ED w/ complaint of CP since 9 am this morning.  Pt endorses eating then laying down.  Describes as a stabbing pain 8/10 non-radiating pain. Denies SOB,HA, blurred vision endorses vomiting, Hx gerd and right sided weakness post stroke. Endorses using crack last night with 2 drinks

## 2025-05-18 NOTE — ED NOTES
"Patient presents via EMS with c/o 7/10 midsternal, non-radiating CP described as a "stabbing" feeling. Patient admits to using crack cocaine last night as well as consuming 2 alcoholic drinks. CP started this AM ~ 0800. Patient endorses constipation, stating she hasn't had a BM in 2 weeks in addition to dysuria. Patient is A/Ox4 and is independent with her ADL's and uses a wheelchair for long distance traveling. Hx stroke with R sided deficits.  Family bedside at time of this assessment. Abdomen soft and non-tender despite the stated current constipation. Patient states she had multiple episodes of emesis this AM. Denies any blood in BM / emesis / urine. Maalox and CXR ordered.   "

## 2025-05-18 NOTE — ED PROVIDER NOTES
Emergency Department Provider Note    Tamiko Mercado   59 y.o. female   [id number]      2025       History     This history was obtained from the patient without limitations.  She presented by ambulance.    She is a 59-year-old with the below past medical history who presents with chest pain that began shortly before arrival.  She developed pain in the middle of her upper chest shortly after eating rice and gravy and potato salad.  She describes the pain as a severe punching sensation.  She has associated shortness of breath, nausea, and vomiting with bilious emesis.  The nausea improved with antiemetic administered by the transporting medics.  She denies headache, lightheadedness, dizziness, and diarrhea.  She smokes cigarettes and uses cocaine, which he last snorted last night.         Past Medical History:   Diagnosis Date    Arthritis     Asthma     as child    Cerebrovascular accident (CVA) due to stenosis of right carotid artery 3/16/2025    CVA (cerebral vascular accident)     Hyperlipemia     Hypertension       Past Surgical History:   Procedure Laterality Date     SECTION, CLASSIC        Family History   Problem Relation Name Age of Onset    Stroke Mother      Hypertension Mother      Heart attack Father        Social History     Socioeconomic History    Marital status: Single   Tobacco Use    Smoking status: Some Days     Current packs/day: 0.50     Types: Cigarettes    Smokeless tobacco: Never   Substance and Sexual Activity    Alcohol use: Yes     Comment: occas, none recently    Drug use: Yes     Types: Cocaine     Comment: smoke crack cocaine 4 days PTA    Sexual activity: Yes     Partners: Male     Birth control/protection: None     Social Drivers of Health     Financial Resource Strain: Low Risk  (3/16/2025)    Overall Financial Resource Strain (CARDIA)     Difficulty of Paying Living Expenses: Not hard at all   Food Insecurity: No Food Insecurity (3/16/2025)    Hunger Vital Sign      Worried About Running Out of Food in the Last Year: Never true     Ran Out of Food in the Last Year: Never true   Transportation Needs: No Transportation Needs (1/30/2025)    Received from [facility]     Lack of Transportation (Medical): No     Lack of Transportation (Non-Medical): No   Physical Activity: Inactive (3/16/2025)    Exercise Vital Sign     Days of Exercise per Week: 0 days     Minutes of Exercise per Session: 0 min   Stress: No Stress Concern Present (3/16/2025)    [employer] - Occupational Stress Questionnaire     Feeling of Stress : Not at all   Housing Stability: Low Risk  (3/16/2025)    Housing Stability Vital Sign     Unable to Pay for Housing in the Last Year: No     Homeless in the Last Year: No      Review of patient's allergies indicates:  No Known Allergies        Physical Examination     Initial Vitals [05/18/25 1409]   BP Pulse Resp Temp SpO2   (!) 170/90 62 14 97.9 °F (36.6 °C) 97 %      MAP       --           Physical Exam    Constitutional: She is not diaphoretic. She appears distressed (Appears uncomfortable).   HENT: Mouth/Throat: Oropharynx is clear and moist.   Eyes: Conjunctivae are normal. No scleral icterus.   Neck: No JVD present.   Cardiovascular:  Normal rate, regular rhythm and normal heart sounds.     Exam reveals no gallop and no friction rub.       No murmur heard.  Pulmonary/Chest: No stridor. No respiratory distress. She has no wheezes. She has no rhonchi.   Abdominal: Abdomen is soft. Bowel sounds are normal. She exhibits no distension. There is no abdominal tenderness.   Musculoskeletal:      Right lower leg: No swelling or tenderness.      Left lower leg: No swelling or tenderness.     Neurological: She is alert and oriented to person, place, and time. GCS score is 15. GCS eye subscore is 4. GCS verbal subscore is 5. GCS motor subscore is 6.   Skin: Skin is warm and dry. No pallor.            Labs     Labs  Reviewed   COMPREHENSIVE METABOLIC PANEL - Abnormal       Result Value    Sodium 139      Potassium 3.2 (*)     Chloride 106      CO2 22 (*)     Glucose 96      BUN 16      Creatinine 0.7      Calcium 8.7      Protein Total 6.8      Albumin 3.1 (*)     Bilirubin Total 0.5      ALP 73      AST 10 (*)     ALT 7 (*)     Anion Gap 11      eGFR >60     CBC WITH DIFFERENTIAL - Abnormal    WBC 7.10      RBC 4.23      HGB 11.0 (*)     HCT 34.5 (*)     MCV 82      MCH 26.0 (*)     MCHC 31.9 (*)     RDW 13.4      Platelet Count 291      MPV 9.7      Nucleated RBC 0      Neut % 69.1      Lymph % 22.8      Mono % 6.6      Eos % 1.0      Basophil % 0.4      Imm Grans % 0.1      Neut # 4.90      Lymph # 1.62      Mono # 0.47      Eos # 0.07      Baso # 0.03      Imm Grans # 0.01     CBC WITH DIFFERENTIAL - Abnormal    WBC 6.98      RBC 4.25      HGB 11.1 (*)     HCT 34.6 (*)     MCV 81 (*)     MCH 26.1 (*)     MCHC 32.1      RDW 13.4      Platelet Count 296      MPV 9.8      Nucleated RBC 0      Neut % 70.9      Lymph % 21.5      Mono % 6.4      Eos % 0.6      Basophil % 0.3      Imm Grans % 0.3      Neut # 4.95      Lymph # 1.50      Mono # 0.45      Eos # 0.04      Baso # 0.02      Imm Grans # 0.02     TROPONIN I HIGH SENSITIVITY - Normal    Troponin High Sensitive 4     B-TYPE NATRIURETIC PEPTIDE - Normal    BNP 90     MAGNESIUM - Normal    Magnesium  1.8     TROPONIN I HIGH SENSITIVITY - Normal    Troponin High Sensitive 3     CBC W/ AUTO DIFFERENTIAL    Narrative:     The following orders were created for panel order CBC auto differential.  Procedure                               Abnormality         Status                     ---------                               -----------         ------                     CBC with Differential[[id number]]       Abnormal            Final result                 Please view results for these tests on the individual orders.   CBC W/ AUTO DIFFERENTIAL    Narrative:     The following orders  were created for panel order CBC auto differential.  Procedure                               Abnormality         Status                     ---------                               -----------         ------                     CBC with Differential[[id number]]       Abnormal            Final result                 Please view results for these tests on the individual orders.   CBC W/ AUTO DIFFERENTIAL    Narrative:     The following orders were created for panel order CBC auto differential.  Procedure                               Abnormality         Status                     ---------                               -----------         ------                     CBC with Differential[[id number]]                                                        Please view results for these tests on the individual orders.   COMPREHENSIVE METABOLIC PANEL   TROPONIN I HIGH SENSITIVITY   B-TYPE NATRIURETIC PEPTIDE   MAGNESIUM   CBC WITH DIFFERENTIAL        Imaging     Imaging Results               CTA Chest Abdomen Pelvis (XPD) (Final result)  Result time 05/19/25 03:20:17      Final result by Liuz Abebe MD (05/19/25 03:20:17)                   Impression:      Chest CTA, Abdomen CTA, and Pelvis CTA:    Artifact-degraded examination.    No thoracic or abdominal aortic aneurysm or evidence of acute aortic syndrome.    1.9 cm soft tissue nodule in the left adnexa is difficult to separate from the adjacent small bowel, such that an adhesion is possibly present.  But there is no evidence of associated SBO.  Further evaluation could be obtained with initial pelvic ultrasound.    Interval development of right supraclavicular, bilateral hilar, and mediastinal lymphadenopathy, with some necrotic lymphadenopathy suspected, since the 03/16/2025 chest CTA.  This short-term interval change favors infectious or inflammatory etiology, but neoplasm is not excluded.  Clinical correlation is recommended.  Tissue biopsy could be obtained,  perhaps via transbronchial biopsy, for more definitive characterization.  Follow-up imaging also recommended.    Indeterminate 1.5 cm left adrenal nodule, new compared to CTA chest 03/16/2025, and therefore possibly also inflammatory/infectious, with neoplasm again not excluded.    Stable scattered sub solid pulmonary micro nodules.    Additional observations as detailed in the body of the report.    This report was flagged in Epic as abnormal.    Electronically signed by resident: Shalonda Luke  Date:    05/19/2025  Time:    01:12    Electronically signed by: Luiz Abebe  Date:    05/19/2025  Time:    03:20               Narrative:    EXAMINATION:  CTA CHEST ABDOMEN PELVIS (XPD)    CLINICAL HISTORY:  Aortic aneurysm, known or suspected;    TECHNIQUE:  Per dissection protocol, CT images of the chest were obtained prior to the administration of IV contrast followed by CT images of the chest, abdomen, and pelvis after the IV administration of 100 mL of Omnipaque 350 .Oral contrast was not given. Post-contrast images were obtained in the arterial and delayed phases.  Axial, coronal, and sagittal reconstructions were created from the source data, including MIP reconstructions for the arterial phase images.    COMPARISON:  CTA chest 03/16/2025    CT lumbar spine 08/04/2024    CT renal stone 11/13/2007    FINDINGS:  Sequences are degraded by patient motion artifact and beam hardening and streak artifact from patient's adjacent and overlying arms.    Thoracic soft tissues: Partially obscured by streak artifact from contrast bolus.  Partially imaged postsurgical change of the right anterior neck.  Subcentimeter hypodense nodule in the right thyroid lobe.  1.7 x 2.9 cm right supraclavicular lymph node conglomerate (04:39) with a string 6 mass effect on the right internal jugular vein.    Aorta: Thoracic aorta is normal in caliber and contour with mild scattered calcific atherosclerosis.    Pulmonary arteries: Although this  scan was targeted toward assessment of the aorta, the pulmonary arteries are reasonably well enhanced proximally and demonstrate no filling defects suspicious for thromboemboli through at least the level of the proximal portions of the segmental pulmonary artery branches.  Assessment of the distal portions and of the subsegmental branches is limited mainly by artifacts.    Note however that the mediastinal soft tissue abnormality exerts some mild mass effect on the mediastinal segment of the left pulmonary artery.    Heart: Normal size.  No pericardial effusion.  Mild calcification in the territory of the LAD.  Aortic valve annulus calcification.    Annika/Mediastinum: Mediastinal lymphadenopathy 1.4 cm right paratracheal lymph node (4:128) and 1.2 cm subcarinal lymph node (4:166) with central hypoattenuation suggestive of necrosis.  Additional prominent and ill-defined, likely conglomerate lymphadenopathy throughout the visceral mediastinum (versus confluent infiltrative soft tissue lesion).    Right hilar 1.4 x 1.3 cm lymphadenopathy (series 4, image 193).    Left hilar 1.6 x 1.1 cm  lymphadenopathy (series 4, image 175).    Lungs: Trachea and bronchi are patent and clear.  Respiratory motion artifact degrades evaluation of the lung parenchyma.  Lungs are symmetrically expanded without consolidation.  Biapical pleuroparenchymal scarring.  Scattered bilateral sub solid juxtapleural pulmonary nodules measuring up to 3 mm for example (04:36, 68, 103) stable compared to CTA 03/16/2025.  Mild diffuse peribronchial wall thickening, nonspecific can be seen with small airways and/or small vessel disease.  Bibasilar curvilinear opacities representing subsegmental atelectasis and or scarring.  No pleural effusion or pneumothorax.    Liver: Normal size and attenuation with smooth contours.  Focal hepatic abnormality.    Gallbladder: Partially contracted without calcified gallstones.  No pericholecystic inflammatory  change.    Bile Ducts: No evidence of dilated ducts.    Pancreas: No mass.  No ductal dilatation or peripancreatic fat stranding.    Spleen: Normal size.  Subcentimeter hyperenhancing focus in the anterolateral parenchyma (4:330), favored to represent a small flash filling hemangioma.    Esophagus: The mid thoracic esophagus is difficult to delineate from the mediastinal lymphadenopathy the without evidence for esophageal thickening although difficult to entirely exclude.    Stomach and duodenum: Unremarkable.    Adrenals: Indeterminate 1.5 cm left adrenal nodule, new compared to CTA 03/16/2025.  The right adrenal gland is unremarkable.    Kidneys/Ureters: Normal size and location with bilateral symmetric enhancement.  No nephrolithiasis or hydroureteronephrosis.  Subcentimeter subcortical hypodensities bilaterally, too small to characterize.    Bladder: Decompressed with circumferential mural thickening.  No significant perivesicular soft tissue stranding.    Reproductive organs: Uterus is unremarkable.  The right adnexa is unremarkable.  2.0 soft tissue lesion in the left adnexa is difficult to separate from the adjacent small bowel (axial series 4, image 61; coronal series 601, image 90).  Questionable left Bartholin's cyst, poorly visualized.    Bowel/Mesentery: Small and large bowel are normal caliber without obstruction or evidence of inflammation.  Appendix is normal.  Moderate volume stool burden throughout the colon and rectum.  No focal colonic wall thickening.    Peritoneum: No intraperitoneal free air or fluid.    Lymph nodes: No pathologic lymph node enlargement in the abdomen or pelvis.    Abdominal wall:  Unremarkable.    Vasculature: No thoracic or aortic aneurysm.  Moderate noncalcific atherosclerotic plaque throughout the thoracic aorta and moderate calcific and noncalcific atherosclerotic plaque of the infrarenal abdominal aorta.  No aortic dissection, pseudoaneurysm or intramural  hematoma.    Bones: No acute fracture. Remote compression deformity of L3 and age indeterminate compression deformity of the anterior superior endplate of L2.  No destructive osseous lesion.  Suspected sacralization of L5.                                       X-Ray Chest AP Portable (Final result)  Result time 05/18/25 19:03:31      Final result by Alberto Salas MD (05/18/25 19:03:31)                   Impression:      Mild right basilar atelectasis.      Electronically signed by: Alberto Salas  Date:    05/18/2025  Time:    19:03               Narrative:    EXAMINATION:  XR CHEST AP PORTABLE    CLINICAL HISTORY:  Epigastric pain    TECHNIQUE:  Single frontal view of the chest was performed.    COMPARISON:  04/10/2025    FINDINGS:  Mild right basilar atelectasis.    The lungs are otherwise clear, with normal appearance of pulmonary vasculature and no pleural effusion or pneumothorax.    The cardiac silhouette is normal in size. The hilar and mediastinal contours are unremarkable.    Bones are intact.                                        ED Course     The patient received the following medications:  Medications   aluminum-magnesium hydroxide-simethicone 200-200-20 mg/5 mL suspension 30 mL (30 mLs Oral Given 5/18/25 1526)   amLODIPine tablet 10 mg (10 mg Oral Given 5/18/25 1605)   lisinopriL tablet 40 mg (40 mg Oral Given 5/18/25 1605)   hydrALAZINE injection 20 mg (20 mg Intravenous Given 5/18/25 1846)   morphine injection 4 mg (4 mg Intravenous Given 5/18/25 1923)   metoclopramide injection 10 mg (10 mg Intravenous Given 5/18/25 1923)   HYDROmorphone injection 1 mg (1 mg Intravenous Given 5/18/25 2032)   potassium bicarbonate disintegrating tablet 40 mEq (40 mEq Oral Given 5/18/25 2259)   iohexoL (OMNIPAQUE 350) injection 100 mL (100 mLs Intravenous Given 5/19/25 0109)       Procedures    ED Course as of 05/20/25 2340   Sun May 18, 2025   1538 EKG 12-lead  Time of study: 05/18/2025 14:08  Independently  interpreted by me.  Normal sinus rhythm.  Ventricular rate 61 beats per minute.  Normal axis.  Normal QRS duration.  Prolonged QTc (545 ms).  No ST segment elevation or depression.   [LP]   1556 Just reassessed the patient.  Chest pain no longer present. [LP]   1909 Just called to bedside by family member. Patient having recurrence of chest pain and nausea. [LP]      ED Course User Index  [LP] Taiwo Aggarwal III, MD        Medical Decision Making                 Medical Decision Making  Differential diagnoses included dysrhythmia, GERD, ACS, PE, aortic dissection, and other conditions.    Workup consisted of cardiopulmonary monitoring, serial exams, EKG, chest x-ray, labs, and CTA chest, abdomen, and pelvis.  Workup was unremarkable.  This included negative serial troponins.    Amount and/or Complexity of Data Reviewed  Labs: ordered.  Radiology: ordered.  ECG/medicine tests:  Decision-making details documented in ED Course.    Risk  OTC drugs.  Prescription drug management.              Diagnoses       ICD-10-CM ICD-9-CM   1. Hypertension, unspecified type  I10 401.9   2. Chest pain  R07.9 786.50   3. Epigastric abdominal pain  R10.13 789.06         Dispostion     Patient signed out to Dr. Baez at shift change with lab results and imaging studies pending.       Taiwo Aggarwal III, MD  05/20/25 8857

## 2025-05-19 VITALS
DIASTOLIC BLOOD PRESSURE: 73 MMHG | TEMPERATURE: 98 F | HEIGHT: 65 IN | RESPIRATION RATE: 20 BRPM | HEART RATE: 63 BPM | SYSTOLIC BLOOD PRESSURE: 171 MMHG | BODY MASS INDEX: 39.15 KG/M2 | OXYGEN SATURATION: 100 % | WEIGHT: 235 LBS

## 2025-05-19 LAB
OHS QRS DURATION: 88 MS
OHS QRS DURATION: 96 MS
OHS QTC CALCULATION: 545 MS
OHS QTC CALCULATION: 545 MS

## 2025-05-19 PROCEDURE — 25500020 PHARM REV CODE 255: Performed by: EMERGENCY MEDICINE

## 2025-05-19 RX ADMIN — IOHEXOL 100 ML: 350 INJECTION, SOLUTION INTRAVENOUS at 01:05

## 2025-05-19 NOTE — PROVIDER PROGRESS NOTES - EMERGENCY DEPT.
Imaging Results               CTA Chest Abdomen Pelvis (XPD) (Final result)  Result time 05/19/25 03:20:17      Final result by Luiz Abebe MD (05/19/25 03:20:17)                   Impression:      Chest CTA, Abdomen CTA, and Pelvis CTA:    Artifact-degraded examination.    No thoracic or abdominal aortic aneurysm or evidence of acute aortic syndrome.    1.9 cm soft tissue nodule in the left adnexa is difficult to separate from the adjacent small bowel, such that an adhesion is possibly present.  But there is no evidence of associated SBO.  Further evaluation could be obtained with initial pelvic ultrasound.    Interval development of right supraclavicular, bilateral hilar, and mediastinal lymphadenopathy, with some necrotic lymphadenopathy suspected, since the 03/16/2025 chest CTA.  This short-term interval change favors infectious or inflammatory etiology, but neoplasm is not excluded.  Clinical correlation is recommended.  Tissue biopsy could be obtained, perhaps via transbronchial biopsy, for more definitive characterization.  Follow-up imaging also recommended.    Indeterminate 1.5 cm left adrenal nodule, new compared to CTA chest 03/16/2025, and therefore possibly also inflammatory/infectious, with neoplasm again not excluded.    Stable scattered sub solid pulmonary micro nodules.    Additional observations as detailed in the body of the report.    This report was flagged in Epic as abnormal.    Electronically signed by resident: Shalonda Luke  Date:    05/19/2025  Time:    01:12    Electronically signed by: Luiz Abebe  Date:    05/19/2025  Time:    03:20               Narrative:    EXAMINATION:  CTA CHEST ABDOMEN PELVIS (XPD)    CLINICAL HISTORY:  Aortic aneurysm, known or suspected;    TECHNIQUE:  Per dissection protocol, CT images of the chest were obtained prior to the administration of IV contrast followed by CT images of the chest, abdomen, and pelvis after the IV administration of 100 mL of  Omnipaque 350 .Oral contrast was not given. Post-contrast images were obtained in the arterial and delayed phases.  Axial, coronal, and sagittal reconstructions were created from the source data, including MIP reconstructions for the arterial phase images.    COMPARISON:  CTA chest 03/16/2025    CT lumbar spine 08/04/2024    CT renal stone 11/13/2007    FINDINGS:  Sequences are degraded by patient motion artifact and beam hardening and streak artifact from patient's adjacent and overlying arms.    Thoracic soft tissues: Partially obscured by streak artifact from contrast bolus.  Partially imaged postsurgical change of the right anterior neck.  Subcentimeter hypodense nodule in the right thyroid lobe.  1.7 x 2.9 cm right supraclavicular lymph node conglomerate (04:39) with a string 6 mass effect on the right internal jugular vein.    Aorta: Thoracic aorta is normal in caliber and contour with mild scattered calcific atherosclerosis.    Pulmonary arteries: Although this scan was targeted toward assessment of the aorta, the pulmonary arteries are reasonably well enhanced proximally and demonstrate no filling defects suspicious for thromboemboli through at least the level of the proximal portions of the segmental pulmonary artery branches.  Assessment of the distal portions and of the subsegmental branches is limited mainly by artifacts.    Note however that the mediastinal soft tissue abnormality exerts some mild mass effect on the mediastinal segment of the left pulmonary artery.    Heart: Normal size.  No pericardial effusion.  Mild calcification in the territory of the LAD.  Aortic valve annulus calcification.    Annika/Mediastinum: Mediastinal lymphadenopathy 1.4 cm right paratracheal lymph node (4:128) and 1.2 cm subcarinal lymph node (4:166) with central hypoattenuation suggestive of necrosis.  Additional prominent and ill-defined, likely conglomerate lymphadenopathy throughout the visceral mediastinum (versus  confluent infiltrative soft tissue lesion).    Right hilar 1.4 x 1.3 cm lymphadenopathy (series 4, image 193).    Left hilar 1.6 x 1.1 cm  lymphadenopathy (series 4, image 175).    Lungs: Trachea and bronchi are patent and clear.  Respiratory motion artifact degrades evaluation of the lung parenchyma.  Lungs are symmetrically expanded without consolidation.  Biapical pleuroparenchymal scarring.  Scattered bilateral sub solid juxtapleural pulmonary nodules measuring up to 3 mm for example (04:36, 68, 103) stable compared to CTA 03/16/2025.  Mild diffuse peribronchial wall thickening, nonspecific can be seen with small airways and/or small vessel disease.  Bibasilar curvilinear opacities representing subsegmental atelectasis and or scarring.  No pleural effusion or pneumothorax.    Liver: Normal size and attenuation with smooth contours.  Focal hepatic abnormality.    Gallbladder: Partially contracted without calcified gallstones.  No pericholecystic inflammatory change.    Bile Ducts: No evidence of dilated ducts.    Pancreas: No mass.  No ductal dilatation or peripancreatic fat stranding.    Spleen: Normal size.  Subcentimeter hyperenhancing focus in the anterolateral parenchyma (4:330), favored to represent a small flash filling hemangioma.    Esophagus: The mid thoracic esophagus is difficult to delineate from the mediastinal lymphadenopathy the without evidence for esophageal thickening although difficult to entirely exclude.    Stomach and duodenum: Unremarkable.    Adrenals: Indeterminate 1.5 cm left adrenal nodule, new compared to CTA 03/16/2025.  The right adrenal gland is unremarkable.    Kidneys/Ureters: Normal size and location with bilateral symmetric enhancement.  No nephrolithiasis or hydroureteronephrosis.  Subcentimeter subcortical hypodensities bilaterally, too small to characterize.    Bladder: Decompressed with circumferential mural thickening.  No significant perivesicular soft tissue  stranding.    Reproductive organs: Uterus is unremarkable.  The right adnexa is unremarkable.  2.0 soft tissue lesion in the left adnexa is difficult to separate from the adjacent small bowel (axial series 4, image 61; coronal series 601, image 90).  Questionable left Bartholin's cyst, poorly visualized.    Bowel/Mesentery: Small and large bowel are normal caliber without obstruction or evidence of inflammation.  Appendix is normal.  Moderate volume stool burden throughout the colon and rectum.  No focal colonic wall thickening.    Peritoneum: No intraperitoneal free air or fluid.    Lymph nodes: No pathologic lymph node enlargement in the abdomen or pelvis.    Abdominal wall:  Unremarkable.    Vasculature: No thoracic or aortic aneurysm.  Moderate noncalcific atherosclerotic plaque throughout the thoracic aorta and moderate calcific and noncalcific atherosclerotic plaque of the infrarenal abdominal aorta.  No aortic dissection, pseudoaneurysm or intramural hematoma.    Bones: No acute fracture. Remote compression deformity of L3 and age indeterminate compression deformity of the anterior superior endplate of L2.  No destructive osseous lesion.  Suspected sacralization of L5.                                       X-Ray Chest AP Portable (Final result)  Result time 05/18/25 19:03:31      Final result by Alberto Salas MD (05/18/25 19:03:31)                   Impression:      Mild right basilar atelectasis.      Electronically signed by: Alberto Salas  Date:    05/18/2025  Time:    19:03               Narrative:    EXAMINATION:  XR CHEST AP PORTABLE    CLINICAL HISTORY:  Epigastric pain    TECHNIQUE:  Single frontal view of the chest was performed.    COMPARISON:  04/10/2025    FINDINGS:  Mild right basilar atelectasis.    The lungs are otherwise clear, with normal appearance of pulmonary vasculature and no pleural effusion or pneumothorax.    The cardiac silhouette is normal in size. The hilar and mediastinal  contours are unremarkable.    Bones are intact.                                       Patient is signed out pending CTA chest abdomen pelvis and repeat troponin.  CTA has multiple incidental findings of enlarged lymph nodes and a couple nodules, patient encouraged to follow up with PCP regarding these findings.  No signs of dissection or other acute emergent process.  Troponin trended and negative x2.  Blood pressure improved following oral medications, doubt hypertensive emergency.  Patient is stable for discharge home with outpatient follow up.

## 2025-05-19 NOTE — DISCHARGE INSTRUCTIONS
Follow up with your primary doctor regarding your incidental findings:  -1.9 cm soft tissue nodule in the left adnexa is difficult to separate from the adjacent small bowel, such that an adhesion is possibly present.  But there is no evidence of associated SBO.  Further evaluation could be obtained with initial pelvic ultrasound.   -Interval development of right supraclavicular, bilateral hilar, and mediastinal lymphadenopathy, with some necrotic lymphadenopathy suspected.  -Indeterminate 1.5 cm left adrenal nodule.   -Stable scattered sub solid pulmonary micro nodules.

## 2025-06-13 ENCOUNTER — HOSPITAL ENCOUNTER (OUTPATIENT)
Facility: HOSPITAL | Age: 60
Discharge: HOME | End: 2025-06-14
Attending: EMERGENCY MEDICINE | Admitting: INTERNAL MEDICINE
Payer: MEDICAID

## 2025-06-13 DIAGNOSIS — I10 HYPERTENSION, UNSPECIFIED TYPE: ICD-10-CM
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
## 2025-06-13 DIAGNOSIS — M79.605 LEFT LEG PAIN: ICD-10-CM

## 2025-06-13 DIAGNOSIS — R07.9 CHEST PAIN: Primary | ICD-10-CM

## 2025-06-13 DIAGNOSIS — M25.551 RIGHT HIP PAIN: ICD-10-CM

## 2025-06-13 DIAGNOSIS — R79.89 ELEVATED TROPONIN: ICD-10-CM

## 2025-06-13 DIAGNOSIS — M79.602 LEFT ARM PAIN: ICD-10-CM

## 2025-06-13 DIAGNOSIS — M79.604 RIGHT LEG PAIN: ICD-10-CM

## 2025-06-13 PROBLEM — Z66 DNR (DO NOT RESUSCITATE): Chronic | Status: ACTIVE | Noted: 2025-03-17

## 2025-06-13 PROBLEM — E66.01 MORBIDLY OBESE: Chronic | Status: ACTIVE | Noted: 2025-06-13

## 2025-06-13 PROBLEM — R07.89 OTHER CHEST PAIN: Chronic | Status: ACTIVE | Noted: 2025-03-16

## 2025-06-13 PROBLEM — J90 PLEURAL EFFUSION: Chronic | Status: ACTIVE | Noted: 2025-06-13

## 2025-06-13 PROBLEM — E87.6 HYPOKALEMIA: Chronic | Status: ACTIVE | Noted: 2025-03-17

## 2025-06-13 PROBLEM — I63.231 CEREBROVASCULAR ACCIDENT (CVA) DUE TO STENOSIS OF RIGHT CAROTID ARTERY: Chronic | Status: ACTIVE | Noted: 2025-03-16

## 2025-06-13 PROBLEM — E66.9 OBESITY: Chronic | Status: ACTIVE | Noted: 2025-06-13

## 2025-06-13 PROBLEM — E66.9 OBESITY: Status: ACTIVE | Noted: 2025-06-13

## 2025-06-13 PROBLEM — F11.20 UNCOMPLICATED OPIOID DEPENDENCE: Chronic | Status: ACTIVE | Noted: 2025-06-13

## 2025-06-13 PROBLEM — D64.9 ANEMIA: Chronic | Status: ACTIVE | Noted: 2025-06-13

## 2025-06-13 PROBLEM — D64.9 ANEMIA: Status: ACTIVE | Noted: 2025-06-13

## 2025-06-13 PROBLEM — F19.90 SUBSTANCE USE: Chronic | Status: ACTIVE | Noted: 2025-03-16

## 2025-06-13 PROBLEM — I31.39 PERICARDIAL EFFUSION: Chronic | Status: ACTIVE | Noted: 2025-06-13

## 2025-06-13 PROBLEM — J81.0 FLASH PULMONARY EDEMA: Status: ACTIVE | Noted: 2025-06-13

## 2025-06-13 PROBLEM — J43.9 PULMONARY EMPHYSEMA: Chronic | Status: ACTIVE | Noted: 2025-06-13

## 2025-06-13 LAB
ABSOLUTE EOSINOPHIL (OHS): 0.27 K/UL
ABSOLUTE MONOCYTE (OHS): 0.72 K/UL (ref 0.3–1)
ABSOLUTE NEUTROPHIL COUNT (OHS): 7.49 K/UL (ref 1.8–7.7)
ALBUMIN SERPL BCP-MCNC: 3 G/DL (ref 3.5–5.2)
ALP SERPL-CCNC: 102 UNIT/L (ref 40–150)
ALT SERPL W/O P-5'-P-CCNC: 34 UNIT/L (ref 10–44)
AMPHET UR QL SCN: NEGATIVE
ANION GAP (OHS): 8 MMOL/L (ref 8–16)
AST SERPL-CCNC: 28 UNIT/L (ref 11–45)
BARBITURATE SCN PRESENT UR: NEGATIVE
BASOPHILS # BLD AUTO: 0.02 K/UL
BASOPHILS NFR BLD AUTO: 0.2 %
BENZODIAZ UR QL SCN: ABNORMAL
BILIRUB SERPL-MCNC: 0.4 MG/DL (ref 0.1–1)
BNP SERPL-MCNC: 67 PG/ML (ref 0–99)
BUN SERPL-MCNC: 16 MG/DL (ref 6–20)
CALCIUM SERPL-MCNC: 8.5 MG/DL (ref 8.7–10.5)
CANNABINOIDS UR QL SCN: NEGATIVE
CHLORIDE SERPL-SCNC: 110 MMOL/L (ref 95–110)
CO2 SERPL-SCNC: 20 MMOL/L (ref 23–29)
COCAINE UR QL SCN: ABNORMAL
CREAT SERPL-MCNC: 0.6 MG/DL (ref 0.5–1.4)
CREAT UR-MCNC: 104 MG/DL (ref 15–325)
D DIMER PPP IA.FEU-MCNC: 7.71 MG/L FEU
ERYTHROCYTE [DISTWIDTH] IN BLOOD BY AUTOMATED COUNT: 13.2 % (ref 11.5–14.5)
ETHANOL UR-MCNC: <10 MG/DL
GFR SERPLBLD CREATININE-BSD FMLA CKD-EPI: >60 ML/MIN/1.73/M2
GLUCOSE SERPL-MCNC: 84 MG/DL (ref 70–110)
HCT VFR BLD AUTO: 31.2 % (ref 37–48.5)
HGB BLD-MCNC: 9.8 GM/DL (ref 12–16)
IMM GRANULOCYTES # BLD AUTO: 0.05 K/UL (ref 0–0.04)
IMM GRANULOCYTES NFR BLD AUTO: 0.5 % (ref 0–0.5)
LYMPHOCYTES # BLD AUTO: 1.1 K/UL (ref 1–4.8)
MAGNESIUM SERPL-MCNC: 1.8 MG/DL (ref 1.6–2.6)
MCH RBC QN AUTO: 26.1 PG (ref 27–31)
MCHC RBC AUTO-ENTMCNC: 31.4 G/DL (ref 32–36)
MCV RBC AUTO: 83 FL (ref 82–98)
METHADONE UR QL SCN: NEGATIVE
NUCLEATED RBC (/100WBC) (OHS): 0 /100 WBC
OHS QRS DURATION: 78 MS
OHS QRS DURATION: 78 MS
OHS QRS DURATION: 84 MS
OHS QTC CALCULATION: 427 MS
OHS QTC CALCULATION: 496 MS
OHS QTC CALCULATION: 527 MS
OPIATES UR QL SCN: ABNORMAL
PCP UR QL: NEGATIVE
PLATELET # BLD AUTO: 330 K/UL (ref 150–450)
PMV BLD AUTO: 9.4 FL (ref 9.2–12.9)
POTASSIUM SERPL-SCNC: 3.4 MMOL/L (ref 3.5–5.1)
PROT SERPL-MCNC: 7.2 GM/DL (ref 6–8.4)
RBC # BLD AUTO: 3.76 M/UL (ref 4–5.4)
RELATIVE EOSINOPHIL (OHS): 2.8 %
RELATIVE LYMPHOCYTE (OHS): 11.4 % (ref 18–48)
RELATIVE MONOCYTE (OHS): 7.5 % (ref 4–15)
RELATIVE NEUTROPHIL (OHS): 77.6 % (ref 38–73)
SODIUM SERPL-SCNC: 138 MMOL/L (ref 136–145)
TROPONIN I SERPL HS-MCNC: 62 NG/L
TROPONIN I SERPL HS-MCNC: 79 NG/L
WBC # BLD AUTO: 9.65 K/UL (ref 3.9–12.7)

## 2025-06-13 PROCEDURE — 63600175 PHARM REV CODE 636 W HCPCS

## 2025-06-13 PROCEDURE — 63600175 PHARM REV CODE 636 W HCPCS: Performed by: STUDENT IN AN ORGANIZED HEALTH CARE EDUCATION/TRAINING PROGRAM

## 2025-06-13 PROCEDURE — G0378 HOSPITAL OBSERVATION PER HR: HCPCS

## 2025-06-13 PROCEDURE — 93005 ELECTROCARDIOGRAM TRACING: CPT

## 2025-06-13 PROCEDURE — 99285 EMERGENCY DEPT VISIT HI MDM: CPT | Mod: 25

## 2025-06-13 PROCEDURE — 83735 ASSAY OF MAGNESIUM: CPT | Performed by: STUDENT IN AN ORGANIZED HEALTH CARE EDUCATION/TRAINING PROGRAM

## 2025-06-13 PROCEDURE — 80307 DRUG TEST PRSMV CHEM ANLYZR: CPT

## 2025-06-13 PROCEDURE — 85379 FIBRIN DEGRADATION QUANT: CPT

## 2025-06-13 PROCEDURE — 93010 ELECTROCARDIOGRAM REPORT: CPT | Mod: ,,, | Performed by: INTERNAL MEDICINE

## 2025-06-13 PROCEDURE — 83880 ASSAY OF NATRIURETIC PEPTIDE: CPT

## 2025-06-13 PROCEDURE — 96375 TX/PRO/DX INJ NEW DRUG ADDON: CPT

## 2025-06-13 PROCEDURE — 84484 ASSAY OF TROPONIN QUANT: CPT

## 2025-06-13 PROCEDURE — 25500020 PHARM REV CODE 255: Performed by: EMERGENCY MEDICINE

## 2025-06-13 PROCEDURE — 84075 ASSAY ALKALINE PHOSPHATASE: CPT

## 2025-06-13 PROCEDURE — 25000003 PHARM REV CODE 250

## 2025-06-13 PROCEDURE — 96374 THER/PROPH/DIAG INJ IV PUSH: CPT

## 2025-06-13 PROCEDURE — 25000003 PHARM REV CODE 250: Performed by: STUDENT IN AN ORGANIZED HEALTH CARE EDUCATION/TRAINING PROGRAM

## 2025-06-13 PROCEDURE — 85025 COMPLETE CBC W/AUTO DIFF WBC: CPT

## 2025-06-13 RX ORDER — AMLODIPINE AND BENAZEPRIL HYDROCHLORIDE 5; 20 MG/1; MG/1
2 CAPSULE ORAL DAILY
Status: DISCONTINUED | OUTPATIENT
Start: 2025-06-14 | End: 2025-06-14 | Stop reason: HOSPADM

## 2025-06-13 RX ORDER — SODIUM CHLORIDE 0.9 % (FLUSH) 0.9 %
1-10 SYRINGE (ML) INJECTION EVERY 12 HOURS PRN
Status: DISCONTINUED | OUTPATIENT
Start: 2025-06-13 | End: 2025-06-14 | Stop reason: HOSPADM

## 2025-06-13 RX ORDER — GABAPENTIN 300 MG/1
300 CAPSULE ORAL 3 TIMES DAILY
Status: DISCONTINUED | OUTPATIENT
Start: 2025-06-14 | End: 2025-06-14 | Stop reason: HOSPADM

## 2025-06-13 RX ORDER — FUROSEMIDE 10 MG/ML
60 INJECTION INTRAMUSCULAR; INTRAVENOUS ONCE
Status: COMPLETED | OUTPATIENT
Start: 2025-06-13 | End: 2025-06-13

## 2025-06-13 RX ORDER — ONDANSETRON 8 MG/1
8 TABLET, ORALLY DISINTEGRATING ORAL EVERY 8 HOURS PRN
Status: DISCONTINUED | OUTPATIENT
Start: 2025-06-13 | End: 2025-06-14 | Stop reason: HOSPADM

## 2025-06-13 RX ORDER — IBUPROFEN 200 MG
24 TABLET ORAL
Status: DISCONTINUED | OUTPATIENT
Start: 2025-06-13 | End: 2025-06-14 | Stop reason: HOSPADM

## 2025-06-13 RX ORDER — POLYETHYLENE GLYCOL 3350 17 G/17G
17 POWDER, FOR SOLUTION ORAL DAILY PRN
Status: DISCONTINUED | OUTPATIENT
Start: 2025-06-13 | End: 2025-06-14 | Stop reason: HOSPADM

## 2025-06-13 RX ORDER — ASPIRIN 81 MG/1
81 TABLET ORAL DAILY
Status: DISCONTINUED | OUTPATIENT
Start: 2025-06-14 | End: 2025-06-14 | Stop reason: HOSPADM

## 2025-06-13 RX ORDER — OXYCODONE AND ACETAMINOPHEN 10; 325 MG/1; MG/1
1 TABLET ORAL EVERY 8 HOURS PRN
Refills: 0 | Status: DISCONTINUED | OUTPATIENT
Start: 2025-06-13 | End: 2025-06-14 | Stop reason: HOSPADM

## 2025-06-13 RX ORDER — IBUPROFEN 200 MG
16 TABLET ORAL
Status: DISCONTINUED | OUTPATIENT
Start: 2025-06-13 | End: 2025-06-14 | Stop reason: HOSPADM

## 2025-06-13 RX ORDER — MORPHINE SULFATE 4 MG/ML
4 INJECTION, SOLUTION INTRAMUSCULAR; INTRAVENOUS
Refills: 0 | Status: COMPLETED | OUTPATIENT
Start: 2025-06-13 | End: 2025-06-13

## 2025-06-13 RX ORDER — NALOXONE HCL 0.4 MG/ML
0.02 VIAL (ML) INJECTION
Status: DISCONTINUED | OUTPATIENT
Start: 2025-06-13 | End: 2025-06-14 | Stop reason: HOSPADM

## 2025-06-13 RX ORDER — CLOPIDOGREL BISULFATE 75 MG/1
75 TABLET ORAL DAILY
Status: DISCONTINUED | OUTPATIENT
Start: 2025-06-14 | End: 2025-06-14 | Stop reason: HOSPADM

## 2025-06-13 RX ORDER — HYDROMORPHONE HYDROCHLORIDE 1 MG/ML
0.5 INJECTION, SOLUTION INTRAMUSCULAR; INTRAVENOUS; SUBCUTANEOUS
Refills: 0 | Status: DISCONTINUED | OUTPATIENT
Start: 2025-06-13 | End: 2025-06-13

## 2025-06-13 RX ORDER — POTASSIUM CHLORIDE 20 MEQ/1
40 TABLET, EXTENDED RELEASE ORAL EVERY 6 HOURS
Status: COMPLETED | OUTPATIENT
Start: 2025-06-13 | End: 2025-06-13

## 2025-06-13 RX ORDER — ALUMINUM HYDROXIDE, MAGNESIUM HYDROXIDE, AND SIMETHICONE 1200; 120; 1200 MG/30ML; MG/30ML; MG/30ML
30 SUSPENSION ORAL 4 TIMES DAILY PRN
Status: DISCONTINUED | OUTPATIENT
Start: 2025-06-13 | End: 2025-06-14 | Stop reason: HOSPADM

## 2025-06-13 RX ORDER — MORPHINE SULFATE 4 MG/ML
4 INJECTION, SOLUTION INTRAMUSCULAR; INTRAVENOUS
Refills: 0 | Status: DISCONTINUED | OUTPATIENT
Start: 2025-06-13 | End: 2025-06-13

## 2025-06-13 RX ORDER — ENOXAPARIN SODIUM 100 MG/ML
40 INJECTION SUBCUTANEOUS EVERY 24 HOURS
Status: DISCONTINUED | OUTPATIENT
Start: 2025-06-14 | End: 2025-06-14 | Stop reason: HOSPADM

## 2025-06-13 RX ORDER — AMLODIPINE AND BENAZEPRIL HYDROCHLORIDE 5; 20 MG/1; MG/1
2 CAPSULE ORAL
Status: COMPLETED | OUTPATIENT
Start: 2025-06-13 | End: 2025-06-13

## 2025-06-13 RX ORDER — IBUPROFEN 200 MG
1 TABLET ORAL DAILY PRN
Status: DISCONTINUED | OUTPATIENT
Start: 2025-06-13 | End: 2025-06-14 | Stop reason: HOSPADM

## 2025-06-13 RX ORDER — TALC
6 POWDER (GRAM) TOPICAL NIGHTLY PRN
Status: DISCONTINUED | OUTPATIENT
Start: 2025-06-13 | End: 2025-06-14 | Stop reason: HOSPADM

## 2025-06-13 RX ORDER — GLUCAGON 1 MG
1 KIT INJECTION
Status: DISCONTINUED | OUTPATIENT
Start: 2025-06-13 | End: 2025-06-14 | Stop reason: HOSPADM

## 2025-06-13 RX ORDER — ACETAMINOPHEN 325 MG/1
650 TABLET ORAL EVERY 4 HOURS PRN
Status: DISCONTINUED | OUTPATIENT
Start: 2025-06-13 | End: 2025-06-14 | Stop reason: HOSPADM

## 2025-06-13 RX ORDER — SIMETHICONE 80 MG
1 TABLET,CHEWABLE ORAL 4 TIMES DAILY PRN
Status: DISCONTINUED | OUTPATIENT
Start: 2025-06-13 | End: 2025-06-14 | Stop reason: HOSPADM

## 2025-06-13 RX ADMIN — MORPHINE SULFATE 4 MG: 4 INJECTION INTRAVENOUS at 01:06

## 2025-06-13 RX ADMIN — POTASSIUM CHLORIDE 40 MEQ: 1500 TABLET, EXTENDED RELEASE ORAL at 06:06

## 2025-06-13 RX ADMIN — FUROSEMIDE 60 MG: 10 INJECTION, SOLUTION INTRAMUSCULAR; INTRAVENOUS at 06:06

## 2025-06-13 RX ADMIN — AMLODIPINE BESYLATE AND BENAZEPRIL HYDROCHLORIDE 2 CAPSULE: 5; 20 CAPSULE ORAL at 12:06

## 2025-06-13 RX ADMIN — HYDROMORPHONE HYDROCHLORIDE 0.5 MG: 1 INJECTION, SOLUTION INTRAMUSCULAR; INTRAVENOUS; SUBCUTANEOUS at 03:06

## 2025-06-13 RX ADMIN — IOHEXOL 100 ML: 350 INJECTION, SOLUTION INTRAVENOUS at 02:06

## 2025-06-13 RX ADMIN — OXYCODONE AND ACETAMINOPHEN 1 TABLET: 325; 10 TABLET ORAL at 11:06

## 2025-06-13 RX ADMIN — POTASSIUM CHLORIDE 40 MEQ: 1500 TABLET, EXTENDED RELEASE ORAL at 11:06

## 2025-06-13 NOTE — ED PROVIDER NOTES
"Encounter Date: 2025       History     Chief Complaint   Patient presents with    Fall     Patient reports that she fell out of bed and hit the right side of her body.    Chest Pain     Patient also report central chest pain.      HPI  60 Y/O F w/Hx of PVD S/P CVA 3/2025 with residural RIGHT-sided hemiparesis 2/2 RIGHT carotid artery stenosis, HTN and HLP C/O pleuritic, reproducible, left-sided "pressure" pain when she takes deep breathing only and no associated diaphoresis, nausea/vomiting or palpitations. Dyspnea with deep breaths. States only movement and deep inspiration elicit the pain. She is unable to ambulate 2/2 CVA and therefore not able to report CPOE or RODRIGUEZ. No LE swelling reported. No fever, chills, vomiting or diarrhea reported. No black of bloody stools. Pt is mostly nonambulatory at baseline.    Review of patient's allergies indicates:  No Known Allergies  Past Medical History:   Diagnosis Date    Arthritis     Asthma     as child    Cerebrovascular accident (CVA) due to stenosis of right carotid artery 3/16/2025    CVA (cerebral vascular accident)     Hyperlipemia     Hypertension      Past Surgical History:   Procedure Laterality Date     SECTION, CLASSIC       Family History   Problem Relation Name Age of Onset    Stroke Mother      Hypertension Mother      Heart attack Father       Social History[1]  Review of Systems    Physical Exam     Initial Vitals [25 0654]   BP Pulse Resp Temp SpO2   (!) 175/78 96 19 98.3 °F (36.8 °C) 97 %      MAP       --         Physical Exam  GENERAL: Calm; Cooperative; Well-appearing and Non-Toxic; Well-Nourished; NAD.  HEENT: AT/NC; anicteric sclera; speaking full sentences with no slurring of speech or drooling/inability to tolerate oral secretions.  NECK: Supple, FROM with no meningismus, no accessory muscle use. No Carotid Bruits B/L.  THORAX: Atraumatic with +TTP that elicits/reproduces subjective chest pain; no appreciated crepitus.   HEART: " Regular rate and rhythm, no M/G/T.  LUNGS: + Tachypnea, but No Retractions, and CTA B/L with no W/R/R.  ABDOMEN: Soft, ND, NTTP.   EXTREMITIES: FROM.  + Symmetrical 1+ pitting edema to B/L LE upto mid tibia.  SKIN: Warm, Dry, No Skin Tears or Rashes.  VASCULAR: 2+ pulses Prox/Dist & Symmetrical with No delay.  NEUROLOGIC: AAOx3; answering questions appropriately without focal deficit    ED Course   Procedures  Labs Reviewed   COMPREHENSIVE METABOLIC PANEL - Abnormal       Result Value    Sodium 138      Potassium 3.4 (*)     Chloride 110      CO2 20 (*)     Glucose 84      BUN 16      Creatinine 0.6      Calcium 8.5 (*)     Protein Total 7.2      Albumin 3.0 (*)     Bilirubin Total 0.4            AST 28      ALT 34      Anion Gap 8      eGFR >60     TROPONIN I HIGH SENSITIVITY - Abnormal    Troponin High Sensitive 79 (*)    TROPONIN I HIGH SENSITIVITY - Abnormal    Troponin High Sensitive 62 (*)    CBC WITH DIFFERENTIAL - Abnormal    WBC 9.65      RBC 3.76 (*)     HGB 9.8 (*)     HCT 31.2 (*)     MCV 83      MCH 26.1 (*)     MCHC 31.4 (*)     RDW 13.2      Platelet Count 330      MPV 9.4      Nucleated RBC 0      Neut % 77.6 (*)     Lymph % 11.4 (*)     Mono % 7.5      Eos % 2.8      Basophil % 0.2      Imm Grans % 0.5      Neut # 7.49      Lymph # 1.10      Mono # 0.72      Eos # 0.27      Baso # 0.02      Imm Grans # 0.05 (*)    D DIMER, QUANTITATIVE - Abnormal    D-Dimer 7.71 (*)    TOXICOLOGY SCREEN, URINE, RANDOM (COMPLIANCE) - Abnormal    Alcohol, Urine <10      Benzodiazepine, Urine Presumptive Positive (*)     Methadone, Urine Negative      Cocaine, Urine Presumptive Positive (*)     Opiates, Urine Presumptive Positive (*)     Barbiturates, Urine Negative      Amphetamines, Urine Negative      THC Negative      Phencyclidine, Urine Negative      Urine Creatinine 104.0      Narrative:     This screen includes the following classes of drugs at the listed cut-off:     Benzodiazepines:        200 ng/ml  "  Methadone:              300 ng/ml   Cocaine metabolite:     300 ng/ml   Opiates:                300 ng/ml   Barbiturates:           200 ng/ml   Amphetamines:           1000 ng/ml   Marijuana metabs (THC): 50 ng/ml   Phencyclidine (PCP):    25 ng/ml     This is a screening test. If results do not correlate with clinical presentation, then a confirmatory send out test is advised.    This report is intended for use in clinical monitoring and management of patients. It is not intended for use in employment related drug testing."   B-TYPE NATRIURETIC PEPTIDE - Normal    BNP 67     MAGNESIUM - Normal    Magnesium  1.8     CBC W/ AUTO DIFFERENTIAL    Narrative:     The following orders were created for panel order CBC auto differential.  Procedure                               Abnormality         Status                     ---------                               -----------         ------                     CBC with Differential[[id number]]       Abnormal            Final result                 Please view results for these tests on the individual orders.        ECG Results              EKG 12-lead (Final result)        Collection Time Result Time QRS Duration [facility] QTC Calculation    06/13/25 07:49:11 06/13/25 07:54:53 84 496                     Final result by Interface, Lab In [facility] (06/13/25 07:54:57)                   Narrative:    Test Reason : R07.9,    Vent. Rate :  88 BPM     Atrial Rate :  88 BPM     P-R Int : 148 ms          QRS Dur :  84 ms      QT Int : 410 ms       P-R-T Axes :  72  58  75 degrees    QTcB Int : 496 ms    Normal sinus rhythm  Possible Left atrial enlargement  LVH  Nonspecific T wave abnormality  Prolonged QT  Abnormal ECG  When compared with ECG of 13-Jun-2025 07:00,  No significant change was found  Confirmed by Jameel Valle ([id number]) on 6/13/2025 7:54:50 AM    Referred By:            Confirmed By: Jameel Valle                                     EKG 12-lead (Final result)        Collection Time " Result Time QRS Duration OHS QTC Calculation    06/13/25 07:00:22 06/13/25 08:55:25 78 527                     Final result by Interface, Lab In [facility] (06/13/25 08:55:29)                   Narrative:    Test Reason : R07.9,    Vent. Rate :  94 BPM     Atrial Rate :  94 BPM     P-R Int : 148 ms          QRS Dur :  78 ms      QT Int : 422 ms       P-R-T Axes :  60  44  63 degrees    QTcB Int : 527 ms    Normal sinus rhythm  Possible Left atrial enlargement  LVH  Nonspecific T wave abnormality  Prolonged QT  Abnormal ECG  When compared with ECG of 18-May-2025 20:18,  Nonspecific T wave abnormality now evident in Inferior leads  Nonspecific T wave abnormality, worse in Anterior-lateral leads  Confirmed by Michelle Choi ([id number]) on 6/13/2025 8:55:23 AM    Referred By:            Confirmed By: Michelle Choi                                  Imaging Results              CTA Chest Non-Coronary (PE Studies) (Final result)  Result time 06/13/25 15:12:15      Final result by Gianluca Chapa MD (06/13/25 15:12:15)                   Impression:      1. No convincing evidence of pulmonary thromboembolism through the segmental levels, allowing for limitations.  2. Findings suggesting sequela of pulmonary edema/CHF pattern including cardiomegaly with interval increased moderate-sized pericardial fluid, new small effusions, and nonspecific pulmonary mosaic attenuation with peribronchial wall thickening and basilar predominant interlobular septal thickening.  Superimposed interstitial type pneumonia or other small airways process not excluded.  No consolidation.  3. Grossly stable other incidental/nonemergent findings in the body of the report.      Electronically signed by: Gianluca Chapa MD  Date:    06/13/2025  Time:    15:12               Narrative:    EXAMINATION:  CTA CHEST NON CORONARY (PE STUDIES)    CLINICAL HISTORY:  Pulmonary embolism (PE) suspected, positive D-dimer;    TECHNIQUE:  Low dose axial images, sagittal and coronal  reformations were obtained from the thoracic inlet to the lung bases following the IV administration of 100 mL of Omnipaque 350 per PE protocol.    Coronal and sagittal reformatted images and axial MIPS were generated from the source data.    COMPARISON:  Chest radiograph earlier same day and CTA chest 05/19/2025    FINDINGS:  Base of Neck: Partially imaged remote operative change of the anterior right neck, similar to prior    Thoracic soft tissues: Grossly stable prominent right supraclavicular lymph node, allowing for streak artifact and timing of contrast bolus.  No axillary lymphadenopathy.    Aorta: Left-sided aortic arch.  Similar mild scattered calcified and noncalcified atherosclerotic plaque.  No aortic aneurysm or dissection seen.    Heart: Enlarged similar to prior.  There is moderate pericardial fluid, increased in volume from prior.  Multi-vessel coronary arterial calcifications as well as calcifications of the aortic annulus and aortic valvular leaflets.  No cardiac thrombus seen.    Pulmonary vasculature: Beam hardening with streak artifact from overlying monitoring leads and contrast bolus within the left subclavian/innominate vein and SVC with respiratory motion somewhat limits evaluation.  Trunk is within normal limits.  Pulmonary arteries distribute normally.  There are four pulmonary veins.  No saddle embolus.  No convincing evidence of pulmonary thromboembolism through the segmental levels, allowing for limitations.    Annika/Mediastinum: Grossly stable scattered prominent mediastinal lymph nodes allowing for streak artifact and timing of contrast bolus.  Grossly similar prominent hilar lymph nodes, allowing for limitations.  Esophagus is normal in course and caliber.    Airways: Patent.    Lungs/Pleura: The lungs are symmetrically expanded.  Small right and trace left layering pleural effusions with associated overlying passive atelectasis, new/increased from previous chest CT 05/19/2025.   Scattered bandlike opacities throughout the lungs consistent with platelike scarring versus atelectasis.  Minimal biapical pleuroparenchymal scarring.  Mild dependent atelectasis.  Bilateral diffuse mild nonspecific pulmonary mosaic attenuation with a mostly centrilobular distribution as well as mild peribronchial wall thickening with basilar predominant nonspecific interlobular septal thickening, overall nonspecific is suggestive of pulmonary edema/CHF pattern.  Interstitial type pneumonia/small airways process not excluded.  No consolidation or pneumothorax.  Grossly stable bilateral scattered nonspecific sub solid and solid pulmonary nodules measuring up to 3 mm.    Esophagus: Unremarkable.    Upper Abdomen: No acute/significant abnormality of the partially imaged upper abdomen.    Bones: No acute fracture. No suspicious lytic or sclerotic lesions.                                       X-Ray Pelvis Routine AP (Final result)  Result time 06/13/25 10:40:39      Final result by Ang Vasquez MD (06/13/25 10:40:39)                   Impression:      See above      Electronically signed by: Ang Vasquez MD  Date:    06/13/2025  Time:    10:40               Narrative:    EXAMINATION:  XR PELVIS ROUTINE AP    CLINICAL HISTORY:  Pain in right hip    TECHNIQUE:  AP view of the pelvis was performed.    COMPARISON:  None.    FINDINGS:  Mild DJD.  Slightly narrowed hip joint spaces.  No fracture or dislocation.  No bone destruction identified                                       X-Ray Knee 3 View Bilateral (Final result)  Result time 06/13/25 10:53:39   Procedure changed from X-Ray Knee 3 View Left     Final result by Madhav Myers MD (06/13/25 10:53:39)                   Impression:      No convincing evidence of acute fracture or dislocation.      Electronically signed by: Madhav Myers  Date:    06/13/2025  Time:    10:53               Narrative:    EXAMINATION:  XR KNEE 3 VIEW BILATERAL    CLINICAL  HISTORY:  Bilateral leg pain; Pain in left leg    TECHNIQUE:  AP, lateral, and Merchant views of both knees were performed.    COMPARISON:  None    FINDINGS:  Right knee: No convincing evidence of acute fracture or dislocation.  Joint spaces appear maintained.  DJD.  No large joint effusion.  No evidence of radiopaque foreign body.    Left knee: No convincing evidence of acute fracture or dislocation.  Joint spaces appear maintained.  DJD.  No large joint effusion.  No evidence of radiopaque foreign body.                                       X-Ray Shoulder Trauma Left (Final result)  Result time 06/13/25 10:39:52      Final result by Ang Vasquez MD (06/13/25 10:39:52)                   Impression:      See above      Electronically signed by: Ang Vasquez MD  Date:    06/13/2025  Time:    10:39               Narrative:    EXAMINATION:  XR SHOULDER TRAUMA 3 VIEW LEFT    CLINICAL HISTORY:  Pain in left arm    TECHNIQUE:  Three views of the left shoulder were performed.    COMPARISON  None    FINDINGS:  No fracture or dislocation.  No bone destruction identified.  No soft tissue calcifications                                       X-Ray Chest AP Portable (Final result)  Result time 06/13/25 10:39:22      Final result by Ang Vasquez MD (06/13/25 10:39:22)                   Impression:      See above      Electronically signed by: Ang Vasquez MD  Date:    06/13/2025  Time:    10:39               Narrative:    EXAMINATION:  XR CHEST AP PORTABLE    CLINICAL HISTORY:  Chest Pain;    TECHNIQUE:  Single frontal view of the chest was performed.    COMPARISON:  Non 05/18/2025 e    FINDINGS:  Mild cardiomegaly and bibasilar edema.  Subsegmental atelectatic changes noted at the right lung base.  The upper lung fields are clear.  No significant pleural effusion.                                       Medications   amlodipine-benazepril 5-20 mg per capsule 2 capsule (2 capsules Oral Given 6/13/25 1247)   morphine injection  4 mg (4 mg Intravenous Given 6/13/25 1308)   iohexoL (OMNIPAQUE 350) injection 100 mL (100 mLs Intravenous Given 6/13/25 1402)   furosemide injection 60 mg (60 mg Intravenous Given 6/13/25 1812)   potassium chloride SA CR tablet 40 mEq (40 mEq Oral Given 6/13/25 2344)     Medical Decision Making  Tamiko Mercado is a 59 y.o. female presenting to the ED with fall and chest pain. History and physical exam as above. Initial vital signs stable and non-actionable. Initial work-up to include: see above    Differential diagnosis considered for this patient includes, but is not limited to: chest pain, fall.  Other severe, more emergent diagnoses considered, but deemed much less likely, to include: PE, ACS    Patient's initial workup was notable for a downtrending troponin and elevated D-dimer.  Patient attempted to leave AMA twice before being convinced to stay for completion of treatment.  She was able to walk around if no worsening shortness of breath.  Patient will be signed out to the oncoming physician team pending CTA PE study.    Amount and/or Complexity of Data Reviewed  Labs: ordered. Decision-making details documented in ED Course.  Radiology: ordered. Decision-making details documented in ED Course.  ECG/medicine tests:  Decision-making details documented in ED Course.    Risk  Prescription drug management.      Additional MDM:     Well's Criteria Score:  -Clinical symptoms of DVT (leg swelling, pain with palpation) = 0.0  -PE is #1 diagnosis OR equally likely =            3.0  -Heart Rate >100 =   0.0  -Immobilization (= or > than 3 days) or surgery in the previous 4 weeks = 1.5  -Previous DVT/PE = 0.0  -Hemoptysis =          0.0  -Malignancy =           0.0  Well's Probability Score =    4.5             Attending Attestation:   Physician Attestation Statement for Resident:  As the supervising MD   Physician Attestation Statement: I have personally seen and examined this patient.   I agree with the above  history.  -:   As the supervising MD I agree with the above PE.     As the supervising MD I agree with the above treatment, course, plan, and disposition.                Attending ED Notes:   STAFF ATTENDING PHYSICIAN NOTE:  I have individually/jointly evaluated Tamiko Mercado and discussed their ED management with the resident physician. I have also reviewed their notes, assessments, and procedures documented.  I was present during all critical portions of any procedure(s) performed on Tamiko Mercado.   ____________________  Nikolay Adan MD, [facility]  Emergency Medicine Staff        ED Course as of 06/15/25 1417   Fri Jun 13, 2025   0734 EKG 12-lead  As per my independent, normal sinus rhythm rate of 94 beats per minute, normal axis, normal intervals, no STEMI [HJ]   0913 Comprehensive metabolic panel(!)  Cmp acutely unremarkable [HJ]   0926 Troponin I High Sensitivity(!): 79  trending [HJ]   0926 BNP: 67 [HJ]   0959 X-Ray Chest AP Portable  Increased vascular congestion as per my independent interpretation.  No focal consolidations. [HJ]   1000 X-Ray Shoulder Trauma Left  As per my independent interpretation no fractures or dislocations seen. [HJ]   1000 X-Ray Knee 3 View Bilateral  As per my independent interpretation no fracture or dislocation seen. [HJ]   1000 X-Ray Pelvis Routine AP  As my independent interpretation no fractures seen.  Pelvic ring stable. [HJ]   1230 D-Dimer(!): 7.71 [HJ]   1234 Troponin I High Sensitivity(!): 62  downtrending [HJ]   1557 Point of care echo demonstrates new effusion [KW]      ED Course User Index  [HJ] Patrick Herron MD  [KW] Jayant Metcalf MD                           Clinical Impression:  Final diagnoses:  [R07.9] Chest pain (Primary)  [M79.602] Left arm pain  [M79.605] Left leg pain  [M79.604] Right leg pain  [M25.551] Right hip pain  [W19.XXXA] Fall, initial encounter  [I10] Hypertension, unspecified type  [R79.89] Elevated troponin          ED Disposition Condition     Observation                       Patrick Herron MD  Resident  06/13/25 1442       [1]   Social History  Tobacco Use    Smoking status: Some Days     Current packs/day: 0.50     Types: Cigarettes    Smokeless tobacco: Never   Substance Use Topics    Alcohol use: Yes     Comment: occas, none recently    Drug use: Yes     Types: Cocaine     Comment: smoke crack cocaine 4 days PTA        Kiran Adan MD  06/15/25 3422

## 2025-06-13 NOTE — ASSESSMENT & PLAN NOTE
Small bilateral effusions noted on CTA, with chronic pericardial effusion. BNP at baseline, although some superimposed edema noted on CXR. Recent TTE with normal LVEF, no obvious diastolic dysfunction but indeterminate.   -Highly suspect due to flash pulmonary edema from cocaine use.   -will trial a dose of lasix and monitor UOP  -currently HD stable on room air; monitor         Noted on CT, chronic,and with interval increased enlargement today. Suspect due to cocaine use/intermittent edema given history. Recent TTE with normal LVEF, and PET stress without signs of ischemia. Recent cardiac MRI at OSH consistent with hypertrophic cardiomyopathy without obstructive pathology. Will continue diuresis as above.

## 2025-06-13 NOTE — ASSESSMENT & PLAN NOTE
Patient presents with complaints of chest pain. She has multiple presentations for the same recently, likely due to cocaine abuse. Cardiac workup with PET stress in 3/2025 showed no signs of ischemia. Cardiac MRI this month at OSH suggestive of hypertrophic cardiomyopathy without obstruction. Trop mildly elevated today, trending downward, and without EKG changes. Highly suspect current presentation is again due to cocaine abuse.   -CTA shows no signs of PE or focal consolidation, and age-adjusted D-dimer suggests low probability of VTE   -trend trop, and monitor on telemetry  -continue ASA, plavix, BP control. Avoiding BB due to active cocaine use.   -advise cessation of illicit substances

## 2025-06-13 NOTE — ASSESSMENT & PLAN NOTE
Body mass index is 31.26 kg/m².; chronic comorbidity affecting medical decision-making. Patient would greatly benefit from weight loss through proper diet and increased physical activity.

## 2025-06-13 NOTE — ASSESSMENT & PLAN NOTE
Patient's most recent potassium results are listed below.   Recent Labs     06/13/25  0836   K 3.4*     Plan  - Replete potassium per protocol  - Monitor potassium Daily  - Check Mg, and replete if indicated

## 2025-06-13 NOTE — ED NOTES
Telemetry Verification   Patient placed on Telemetry Box  Verified with War Room  Box # [id number]   Monitor Tech War room   Rate 81   Rhythm Sinus Normal

## 2025-06-13 NOTE — ED NOTES
Pt fell out of bed at home and landed on bilateral knees. C/o L sided shoulder pain, bilateral knee pain, and chest pain. Hx CVA with R sided deficits.

## 2025-06-13 NOTE — ASSESSMENT & PLAN NOTE
History of COPD, not on home oxygen. Currently without evidence of acute exacerbation, and sats on room air at goal 88% or greater. Will continue hospital formulary equivalent of home regimen and monitor respiratory status. Encourage smoking cessation.

## 2025-06-13 NOTE — ASSESSMENT & PLAN NOTE
Advance Care Planning   I spent less than 16 minutes discussing code status with patient on admission. she states that she would never want CPR or intubation/mechanical ventilation in the event of cardiopulmonary arrest, in agreement with DNR status. Her daughter is her surrogate decision maker.

## 2025-06-13 NOTE — ED NOTES
Nurse walked into pt's room to find pt naked and pulled off all of monitors as well as both ulrasound IV's. Pt states that she is leaving AMA now. MD Gopal aware

## 2025-06-13 NOTE — ASSESSMENT & PLAN NOTE
Chronic, and stable around baseline without indication for transfusion. Etiology likely LEANDRO/chronic disease. Monitor.

## 2025-06-13 NOTE — ASSESSMENT & PLAN NOTE
Reports falling out of her bed. Skeletal survey without acute fracture or signs of trauma. Supportive care.

## 2025-06-13 NOTE — ASSESSMENT & PLAN NOTE
Admits to cocaine use, which is likely leading to recurrent presentations for chest pain and current presentation. Would advise cessation.

## 2025-06-13 NOTE — ED NOTES
Pt family member and pt notified she cannot have anything to eat or drink without physician authorization and test results. This RN walked into room to find pt eating and drinking with family member. Pt reminded to remain NPO for pending procedures.

## 2025-06-13 NOTE — ASSESSMENT & PLAN NOTE
Patient's blood pressure range in the last 24 hours was: BP  Min: 124/72  Max: 214/98.The patient's inpatient anti-hypertensive regimen is listed below:  Current Antihypertensives  amlodipine-benazepril 5-20 mg per capsule 2 capsule, Daily, Oral    Plan  - BP is uncontrolled, will adjust as follows: continue diuresis and home Amlo-benaz. Unclear other home regimen, however will hold beta blockers given cocaine abuse.

## 2025-06-13 NOTE — PROVIDER PROGRESS NOTES - EMERGENCY DEPT.
Encounter Date: 6/13/2025    ED Physician Progress Notes            ED Physician Hand-off Note:    ED Course: I assumed care of patient from off-going ED physician, Dr. Herron.  Briefly, Patient is a 59-year-old female presenting for chest pain.    At the time of signout plan was pending possible admission and echo.    Disposition:  Admission    Patient comfortable with current treatment. Patient counseled regarding exam, results, diagnosis, treatment, and plan.    Impression:  Stable    Given patient was still having chest pain, Dilaudid was ordered.  CT was read as no dissection, no PE, I discussed case with hospital medicine who agreed to admit the patient for echo given that she has a new effusion.  Point of care echo did not demonstrate cardiac tamponade physiology or free wall collapse of the right ventricle.  Compared with the previous echo, effusion is new.        Results for orders placed during the hospital encounter of 03/16/25    Echo    Interpretation Summary    Left Ventricle: The left ventricle is normal in size. Ventricular mass is normal. Normal wall thickness. There is concentric remodeling. Normal wall motion. There is normal systolic function with a visually estimated ejection fraction of 65 - 70%. There is indeterminate diastolic function. presence of mid cavitary obstruction with mid cavitary gadient of 68 mmH at HR of 68 bpm. Even though wall thickness does not meet criteria for HCM, papillary muscles appear thick. In the setting of mid cavitary obstruction cosnider CMRI for evaluation of HCM.    Right Ventricle: The right ventricle is normal in size. Wall thickness is normal. Systolic function is normal.    Left Atrium: Normal left atrial size.    Right Atrium: Normal right atrial size.    Mitral Valve: There is mild to moderate regurgitation with a centrally directed jet.    Aorta: Aortic root is normal in size measuring 2.79 cm. Aortic root at ST junction is normal. Ascending aorta is normal  measuring 2.86 cm.    IVC/SVC: Normal venous pressure at 3 mmHg.    Pericardium: There is no pericardial effusion.      Final diagnoses:  [R07.9] Chest pain  [M79.602] Left arm pain  [M79.605] Left leg pain  [M79.604] Right leg pain  [M25.551] Right hip pain

## 2025-06-13 NOTE — HPI
"Tamiko Mercado is a 59 y.o. female with COPD, HTN, history of CVA s/p R carotid stent with debility, anemia, chronic pain with opioid dependence, cocaine abuse with recurrent presentations for chest pain presenting with chest pain and fall.     She reports that early this morning she was reaching either for her medication or her inhaler and fell out of her bed. She noted increased lower back pain and chest pain after this, with "soreness" all over. This prompted presentation to the ED. Denies central, radiating chest pain, but feels this was due to her fall. Denies new SOB.    States that she was just discharged from [facility] a few days ago due to chest pain and "fluid on her lungs."     She endorses continued crack cocaine use, but states she has not used this in 2 weeks. Continues to smoke cigarettes.   "

## 2025-06-13 NOTE — ASSESSMENT & PLAN NOTE
Currently with stable deficits. S/p R carotid stent. Continue ASA + Plavix, although unclear compliance.

## 2025-06-14 VITALS
HEART RATE: 81 BPM | BODY MASS INDEX: 31.29 KG/M2 | TEMPERATURE: 99 F | WEIGHT: 187.81 LBS | OXYGEN SATURATION: 96 % | RESPIRATION RATE: 20 BRPM | HEIGHT: 65 IN | DIASTOLIC BLOOD PRESSURE: 60 MMHG | SYSTOLIC BLOOD PRESSURE: 123 MMHG

## 2025-06-14 LAB
ANION GAP (OHS): 12 MMOL/L (ref 8–16)
BUN SERPL-MCNC: 10 MG/DL (ref 6–20)
CALCIUM SERPL-MCNC: 8.5 MG/DL (ref 8.7–10.5)
CHLORIDE SERPL-SCNC: 108 MMOL/L (ref 95–110)
CO2 SERPL-SCNC: 21 MMOL/L (ref 23–29)
CREAT SERPL-MCNC: 0.6 MG/DL (ref 0.5–1.4)
GFR SERPLBLD CREATININE-BSD FMLA CKD-EPI: >60 ML/MIN/1.73/M2
GLUCOSE SERPL-MCNC: 75 MG/DL (ref 70–110)
MAGNESIUM SERPL-MCNC: 1.8 MG/DL (ref 1.6–2.6)
POTASSIUM SERPL-SCNC: 3.6 MMOL/L (ref 3.5–5.1)
SODIUM SERPL-SCNC: 141 MMOL/L (ref 136–145)
TROPONIN I SERPL HS-MCNC: 36 NG/L

## 2025-06-14 PROCEDURE — 84484 ASSAY OF TROPONIN QUANT: CPT | Performed by: STUDENT IN AN ORGANIZED HEALTH CARE EDUCATION/TRAINING PROGRAM

## 2025-06-14 PROCEDURE — 80048 BASIC METABOLIC PNL TOTAL CA: CPT | Performed by: STUDENT IN AN ORGANIZED HEALTH CARE EDUCATION/TRAINING PROGRAM

## 2025-06-14 PROCEDURE — 36415 COLL VENOUS BLD VENIPUNCTURE: CPT | Performed by: STUDENT IN AN ORGANIZED HEALTH CARE EDUCATION/TRAINING PROGRAM

## 2025-06-14 PROCEDURE — G0378 HOSPITAL OBSERVATION PER HR: HCPCS

## 2025-06-14 PROCEDURE — 83735 ASSAY OF MAGNESIUM: CPT | Performed by: STUDENT IN AN ORGANIZED HEALTH CARE EDUCATION/TRAINING PROGRAM

## 2025-06-14 PROCEDURE — 25000003 PHARM REV CODE 250: Performed by: STUDENT IN AN ORGANIZED HEALTH CARE EDUCATION/TRAINING PROGRAM

## 2025-06-14 RX ADMIN — AMLODIPINE BESYLATE AND BENAZEPRIL HYDROCHLORIDE 2 CAPSULE: 5; 20 CAPSULE ORAL at 08:06

## 2025-06-14 RX ADMIN — ASPIRIN 81 MG: 81 TABLET, COATED ORAL at 08:06

## 2025-06-14 RX ADMIN — GABAPENTIN 300 MG: 300 CAPSULE ORAL at 08:06

## 2025-06-14 RX ADMIN — CLOPIDOGREL 75 MG: 75 TABLET ORAL at 08:06

## 2025-06-14 NOTE — PLAN OF CARE
[facility]  Discharge Final Note    Primary Care Provider: No, Primary Doctor    Expected Discharge Date: 6/14/2025    Patient cleared for discharge from case management standpoint.    Final Discharge Note (most recent)       Final Note - 06/14/25 1000          Final Note    Assessment Type Final Discharge Note     Anticipated Discharge Disposition Home or Self Care     What phone number can be called within the next 1-3 days to see how you are doing after discharge? [phone]     Hospital Resources/Appts/Education Provided Provided patient/caregiver with written discharge plan information;Appointments scheduled and added to AVS        Post-Acute Status    Discharge Delays None known at this time                   Contact Info       No, Primary Doctor   Relationship: PCP - General        Next Steps: Follow up    Amber Amos CRNP   Specialty: Internal Medicine    [address] [facility] [address]   Phone: [phone]       Next Steps: Follow up on 7/14/2025    Instructions: Hospital follow up scheduled for patient on July 14, 2025 @ 2:00pm.          Alexia Gordon RN  Weekend  - [facility]  Ext. [phone]

## 2025-06-14 NOTE — H&P
"  [facility] [address] [place] [address] Medicine  History & Physical    Patient Name: Tamiko Mercado  MRN: [id number]  Patient Class: OP- Observation  Admission Date: 2025  Attending Physician: Fili Ramey MD   Primary Care Provider: Jes Primary Doctor         Patient information was obtained from patient, past medical records, and ER records.     Subjective:     Principal Problem:Other chest pain    Chief Complaint:   Chief Complaint   Patient presents with    Fall     Patient reports that she fell out of bed and hit the right side of her body.    Chest Pain     Patient also report central chest pain.         HPI: Tamiko Mercado is a 59 y.o. female with COPD, HTN, history of CVA s/p R carotid stent with debility, anemia, chronic pain with opioid dependence, cocaine abuse with recurrent presentations for chest pain presenting with chest pain and fall.     She reports that early this morning she was reaching either for her medication or her inhaler and fell out of her bed. She noted increased lower back pain and chest pain after this, with "soreness" all over. This prompted presentation to the ED. Denies central, radiating chest pain, but feels this was due to her fall. Denies new SOB.    States that she was just discharged from [facility] a few days ago due to chest pain and "fluid on her lungs."     She endorses continued crack cocaine use, but states she has not used this in 2 weeks. Continues to smoke cigarettes.     Past Medical History:   Diagnosis Date    Arthritis     Asthma     as child    Cerebrovascular accident (CVA) due to stenosis of right carotid artery 3/16/2025    CVA (cerebral vascular accident)     Hyperlipemia     Hypertension        Past Surgical History:   Procedure Laterality Date     SECTION, CLASSIC         Review of patient's allergies indicates:  No Known Allergies    No current facility-administered medications on file prior to encounter.     Current Outpatient Medications on " File Prior to Encounter   Medication Sig    acetaminophen (TYLENOL) 325 MG tablet Take 2 tablets (650 mg total) by mouth every 8 (eight) hours as needed.    amLODIPine-benazepriL (LOTREL) 10-40 mg per capsule Take 1 capsule by mouth once daily.    butalbital-acetaminophen-caffeine -40 mg (FIORICET, ESGIC) -40 mg per tablet Take 1-2 tablets by mouth every 6 (six) hours as needed for Headaches.    carisoprodol (SOMA) 350 MG tablet Take 350 mg by mouth 4 (four) times daily as needed for Muscle spasms.    esomeprazole (NEXIUM) 40 MG capsule Take 1 capsule (40 mg total) by mouth once daily.    gabapentin (NEURONTIN) 600 MG tablet Take 600 mg by mouth 3 (three) times daily.    omeprazole (PRILOSEC) 40 MG capsule Take 1 capsule (40 mg total) by mouth once daily.    oxycodone-acetaminophen (PERCOCET)  mg per tablet Take 1 tablet by mouth every 4 (four) hours as needed for Pain.     Family History       Problem Relation (Age of Onset)    Heart attack Father    Hypertension Mother    Stroke Mother          Tobacco Use    Smoking status: Some Days     Current packs/day: 0.50     Types: Cigarettes    Smokeless tobacco: Never   Substance and Sexual Activity    Alcohol use: Yes     Comment: occas, none recently    Drug use: Yes     Types: Cocaine     Comment: smoke crack cocaine 4 days PTA    Sexual activity: Yes     Partners: Male     Birth control/protection: None     Review of Systems   All other systems reviewed and are negative.    Objective:     Vital Signs (Most Recent):  Temp: 98.1 °F (36.7 °C) (06/13/25 1909)  Pulse: 100 (06/13/25 1909)  Resp: 20 (06/13/25 1909)  BP: 124/72 (06/13/25 1909)  SpO2: (!) 94 % (06/13/25 1909) Vital Signs (24h Range):  Temp:  [98 °F (36.7 °C)-98.3 °F (36.8 °C)] 98.1 °F (36.7 °C)  Pulse:  [] 100  Resp:  [17-20] 20  SpO2:  [94 %-100 %] 94 %  BP: (124-214)/(72-98) 124/72     Weight: 85.2 kg (187 lb 13.3 oz)  Body mass index is 31.26 kg/m².     Physical Exam  Vitals and  nursing note reviewed.   Constitutional:       General: She is not in acute distress.     Appearance: She is well-developed. She is not diaphoretic.   HENT:      Head: Normocephalic and atraumatic.   Eyes:      General: No scleral icterus.     Conjunctiva/sclera: Conjunctivae normal.   Neck:      Vascular: No JVD.   Cardiovascular:      Rate and Rhythm: Normal rate and regular rhythm.      Heart sounds: Murmur heard.   Pulmonary:      Effort: Pulmonary effort is normal. No respiratory distress.      Breath sounds: Rales present.   Musculoskeletal:      Right lower leg: No edema.      Left lower leg: No edema.   Skin:     Coloration: Skin is not jaundiced or pale.   Neurological:      Mental Status: She is alert and oriented to person, place, and time.      Motor: No abnormal muscle tone.   Psychiatric:         Mood and Affect: Mood normal.         Behavior: Behavior normal.                Significant Labs: All pertinent labs within the past 24 hours have been reviewed.  CBC:   Recent Labs   Lab 06/13/25  0836   WBC 9.65   HGB 9.8*   HCT 31.2*        CMP:   Recent Labs   Lab 06/13/25  0836      K 3.4*      CO2 20*   GLU 84   BUN 16   CREATININE 0.6   CALCIUM 8.5*   PROT 7.2   ALBUMIN 3.0*   BILITOT 0.4   ALKPHOS 102   AST 28   ALT 34   ANIONGAP 8       Significant Imaging: I have reviewed all pertinent imaging results/findings within the past 24 hours.  Assessment/Plan:     Assessment & Plan  Other chest pain  Patient presents with complaints of chest pain. She has multiple presentations for the same recently, likely due to cocaine abuse. Cardiac workup with PET stress in 3/2025 showed no signs of ischemia. Cardiac MRI this month at OSH suggestive of hypertrophic cardiomyopathy without obstruction. Trop mildly elevated today, trending downward, and without EKG changes. Highly suspect current presentation is again due to cocaine abuse.   -CTA shows no signs of PE or focal consolidation, and  age-adjusted D-dimer suggests low probability of VTE   -trend trop, and monitor on telemetry  -continue ASA, plavix, BP control. Avoiding BB due to active cocaine use.   -advise cessation of illicit substances  Pleural effusion  Flash pulmonary edema  Pericardial effusion  Small bilateral effusions noted on CTA, with chronic pericardial effusion. BNP at baseline, although some superimposed edema noted on CXR. Recent TTE with normal LVEF, no obvious diastolic dysfunction but indeterminate.   -Highly suspect due to flash pulmonary edema from cocaine use.   -will trial a dose of lasix and monitor UOP  -currently HD stable on room air; monitor         Noted on CT, chronic,and with interval increased enlargement today. Suspect due to cocaine use/intermittent edema given history. Recent TTE with normal LVEF, and PET stress without signs of ischemia. Recent cardiac MRI at OSH consistent with hypertrophic cardiomyopathy without obstructive pathology. Will continue diuresis as above.     Substance use  Admits to cocaine use, which is likely leading to recurrent presentations for chest pain and current presentation. Would advise cessation.     DNR (do not resuscitate)  Advance Care Planning   I spent less than 16 minutes discussing code status with patient on admission. she states that she would never want CPR or intubation/mechanical ventilation in the event of cardiopulmonary arrest, in agreement with DNR status. Her daughter is her surrogate decision maker.        HTN (hypertension)  Patient's blood pressure range in the last 24 hours was: BP  Min: 124/72  Max: 214/98.The patient's inpatient anti-hypertensive regimen is listed below:  Current Antihypertensives  amlodipine-benazepril 5-20 mg per capsule 2 capsule, Daily, Oral    Plan  - BP is uncontrolled, will adjust as follows: continue diuresis and home Amlo-benaz. Unclear other home regimen, however will hold beta blockers given cocaine abuse.  Cerebrovascular accident  (CVA) due to stenosis of right carotid artery  Currently with stable deficits. S/p R carotid stent. Continue ASA + Plavix, although unclear compliance.   Hypokalemia  Patient's most recent potassium results are listed below.   Recent Labs     06/13/25  0836   K 3.4*     Plan  - Replete potassium per protocol  - Monitor potassium Daily  - Check Mg, and replete if indicated  Normocytic anemia  Chronic, and stable around baseline without indication for transfusion. Etiology likely LEANDRO/chronic disease. Monitor.     Morbidly obese  Body mass index is 31.26 kg/m².; chronic comorbidity affecting medical decision-making. Patient would greatly benefit from weight loss through proper diet and increased physical activity.      Pulmonary emphysema  History of COPD, not on home oxygen. Currently without evidence of acute exacerbation, and sats on room air at goal 88% or greater. Will continue hospital formulary equivalent of home regimen and monitor respiratory status. Encourage smoking cessation.   Uncomplicated opioid dependence  PDMP reviewed. Continue home regimen.     Fall  Reports falling out of her bed. Skeletal survey without acute fracture or signs of trauma. Supportive care.     VTE Risk Mitigation (From admission, onward)           Ordered     enoxaparin injection 40 mg  Every 24 hours         06/13/25 1732     IP VTE HIGH RISK PATIENT  Once         06/13/25 1732     Place sequential compression device  Until discontinued         06/13/25 1732                       On 06/13/2025, patient should be placed in hospital observation services under my care.             Aldair Shaffer MD  Department of Hospital Medicine  [facility]

## 2025-06-14 NOTE — HOSPITAL COURSE
Patient admitted with complaints of chest pain in setting of cocaine use. Has had multiple presentations for similar symptoms after cocaine abuse. Cardiac workup with PET stress in 3/2025 showed no signs of ischemia. Cardiac MRI this month at OSH suggestive of hypertrophic cardiomyopathy without obstruction. Trop mildly elevated and downtrended and without EKG changes. Did not have any recurrent chest pain while admitted. Etiology of CP likely from cocaine use. Counseled extensively on cocaine cessation. CT imaging also notable for small pleural effusions; volume status euvolemic, likely from cocaine use. Given diuretics with good urine output. Patient eager to discharge. Pt seen and evaluated on day of discharge and deemed appropriate for discharge. Plan discussed with pt, who was agreeable and amenable; medications were discussed and reviewed, outpatient follow-up scheduled, ER precautions were given, all questions were answered to the pt's satisfaction, and patient was subsequently discharged.

## 2025-06-14 NOTE — ASSESSMENT & PLAN NOTE
Patient's most recent potassium results are listed below.   Recent Labs     06/13/25  0836 06/14/25  0233   K 3.4* 3.6     Plan  - Replete potassium per protocol  - Monitor potassium Daily  - Check Mg, and replete if indicated

## 2025-06-14 NOTE — PLAN OF CARE
I was unable to schedule patient cardiology's appointment due to appointment books being blocked. I was able to send cardiology clinic a message.    Rimma Harp

## 2025-06-14 NOTE — PLAN OF CARE
[facility]  Discharge Assessment    Primary Care Provider: No, Primary Doctor     Admission Diagnosis: Right leg pain [M79.604]  Left leg pain [M79.605]  Elevated troponin [R79.89]  Right hip pain [M25.551]  Left arm pain [M79.602]  Chest pain [R07.9]  Fall, initial encounter [W19.XXXA]  Hypertension, unspecified type [I10]    Admission Date: 6/13/2025  Expected Discharge Date: 6/14/2025    Payor: MEDICAID / Plan: [facility] COMMUNITY PLAN [employer] ([place] MEDICAID) / Product Type: Managed Medicaid /     Extended Emergency Contact Information  Primary Emergency Contact: Bhumi Mercado  Mobile Phone: [phone]  Relation: Daughter  Preferred language: English   needed? No    Secondary Emergency Contact: Yeny Mercado  [place]  Mobile Phone: [phone]  Relation: Daughter    Discharge Plan A: Home with family  Discharge Plan B: Home with family    [id number] [address] AT [facility] [place] [address]  [address] [facility] [address]  Phone: [phone] Fax: [phone]    Discharge Assessment (most recent)       BRIEF DISCHARGE ASSESSMENT - 06/14/25 0846          Discharge Planning    Assessment Type Discharge Planning Brief Assessment     Resource/Environmental Concerns none     Support Systems Children     Equipment Currently Used at Home walker, rolling;cane, straight;wheelchair     Current Living Arrangements home     Patient/Family Anticipates Transition to home with family     Patient/Family Anticipated Services at Transition none     DME Needed Upon Discharge  none     Discharge Plan A Home with family     Discharge Plan B Home with family                 Discharge Plan A and Plan B have been determined by review of patient's clinical status, future medical and therapeutic needs, and coverage/benefits for post-acute care in coordination with multidisciplinary team members.      Alexia Gordon RN  Weekend  -  [facility]  Ext. [phone]

## 2025-06-14 NOTE — ASSESSMENT & PLAN NOTE
Patient's blood pressure range in the last 24 hours was: BP  Min: 123/60  Max: 214/98.The patient's inpatient anti-hypertensive regimen is listed below:  Current Antihypertensives  amlodipine-benazepril 5-20 mg per capsule 2 capsule, Daily, Oral    Plan  - BP is uncontrolled, will adjust as follows: continue diuresis and home Amlo-benaz. Unclear other home regimen, however will hold beta blockers given cocaine abuse.

## 2025-06-14 NOTE — ACP (ADVANCE CARE PLANNING)
Advance Care Planning     Date: 06/14/2025    Code Status  A voluntary conversation about the patient's preferences for care at the very end of life. The patient wishes to have a natural, peaceful death.  Along those lines, the patient does not wish to have CPR or other invasive treatments performed when her heart and/or breathing stops. I communicated to the patient that a DNR order would be placed in her medical record to reflect this preference.    A total of 16 min was spent on advance care planning, goals of care discussion, emotional support, formulating and communicating prognosis and exploring burden/benefit of various approaches of treatment. This discussion occurred on a fully voluntary basis with the verbal consent of the patient and/or family.

## 2025-06-14 NOTE — PLAN OF CARE
Pt arrived to unit via stretcher, AAOX4. Ambulated to bed with X1 assistance. Resp even and unlabored. Skin w/d to touch. Tele monitor intact. Rt sided weakness noted. BSC in place. NAD noted at present. Will continue to monitor.  Problem: Skin Injury Risk Increased  Goal: Skin Health and Integrity  Outcome: Progressing     Problem: Adult Inpatient Plan of Care  Goal: Plan of Care Review  Outcome: Progressing  Goal: Patient-Specific Goal (Individualized)  Outcome: Progressing  Goal: Absence of Hospital-Acquired Illness or Injury  Outcome: Progressing  Goal: Optimal Comfort and Wellbeing  Outcome: Progressing  Goal: Readiness for Transition of Care  Outcome: Progressing     Problem: Fall Injury Risk  Goal: Absence of Fall and Fall-Related Injury  Outcome: Progressing

## 2025-06-14 NOTE — PLAN OF CARE
CM noted discharge order and reviewed chart. Disposition is home with family, no needs. Ambulatory referral placed for cardiology, will be arranged per [facility]. Family will provide transportation home.    Alexia Gordon RN  Weekend  - [facility]  Ext. [phone]

## 2025-06-14 NOTE — SUBJECTIVE & OBJECTIVE
Past Medical History:   Diagnosis Date    Arthritis     Asthma     as child    Cerebrovascular accident (CVA) due to stenosis of right carotid artery 3/16/2025    CVA (cerebral vascular accident)     Hyperlipemia     Hypertension        Past Surgical History:   Procedure Laterality Date     SECTION, CLASSIC         Review of patient's allergies indicates:  No Known Allergies    No current facility-administered medications on file prior to encounter.     Current Outpatient Medications on File Prior to Encounter   Medication Sig    acetaminophen (TYLENOL) 325 MG tablet Take 2 tablets (650 mg total) by mouth every 8 (eight) hours as needed.    amLODIPine-benazepriL (LOTREL) 10-40 mg per capsule Take 1 capsule by mouth once daily.    butalbital-acetaminophen-caffeine -40 mg (FIORICET, ESGIC) -40 mg per tablet Take 1-2 tablets by mouth every 6 (six) hours as needed for Headaches.    carisoprodol (SOMA) 350 MG tablet Take 350 mg by mouth 4 (four) times daily as needed for Muscle spasms.    esomeprazole (NEXIUM) 40 MG capsule Take 1 capsule (40 mg total) by mouth once daily.    gabapentin (NEURONTIN) 600 MG tablet Take 600 mg by mouth 3 (three) times daily.    omeprazole (PRILOSEC) 40 MG capsule Take 1 capsule (40 mg total) by mouth once daily.    oxycodone-acetaminophen (PERCOCET)  mg per tablet Take 1 tablet by mouth every 4 (four) hours as needed for Pain.     Family History       Problem Relation (Age of Onset)    Heart attack Father    Hypertension Mother    Stroke Mother          Tobacco Use    Smoking status: Some Days     Current packs/day: 0.50     Types: Cigarettes    Smokeless tobacco: Never   Substance and Sexual Activity    Alcohol use: Yes     Comment: occas, none recently    Drug use: Yes     Types: Cocaine     Comment: smoke crack cocaine 4 days PTA    Sexual activity: Yes     Partners: Male     Birth control/protection: None     Review of Systems   All other systems reviewed and  are negative.    Objective:     Vital Signs (Most Recent):  Temp: 98.1 °F (36.7 °C) (06/13/25 1909)  Pulse: 100 (06/13/25 1909)  Resp: 20 (06/13/25 1909)  BP: 124/72 (06/13/25 1909)  SpO2: (!) 94 % (06/13/25 1909) Vital Signs (24h Range):  Temp:  [98 °F (36.7 °C)-98.3 °F (36.8 °C)] 98.1 °F (36.7 °C)  Pulse:  [] 100  Resp:  [17-20] 20  SpO2:  [94 %-100 %] 94 %  BP: (124-214)/(72-98) 124/72     Weight: 85.2 kg (187 lb 13.3 oz)  Body mass index is 31.26 kg/m².     Physical Exam  Vitals and nursing note reviewed.   Constitutional:       General: She is not in acute distress.     Appearance: She is well-developed. She is not diaphoretic.   HENT:      Head: Normocephalic and atraumatic.   Eyes:      General: No scleral icterus.     Conjunctiva/sclera: Conjunctivae normal.   Neck:      Vascular: No JVD.   Cardiovascular:      Rate and Rhythm: Normal rate and regular rhythm.      Heart sounds: Murmur heard.   Pulmonary:      Effort: Pulmonary effort is normal. No respiratory distress.      Breath sounds: Rales present.   Musculoskeletal:      Right lower leg: No edema.      Left lower leg: No edema.   Skin:     Coloration: Skin is not jaundiced or pale.   Neurological:      Mental Status: She is alert and oriented to person, place, and time.      Motor: No abnormal muscle tone.   Psychiatric:         Mood and Affect: Mood normal.         Behavior: Behavior normal.                Significant Labs: All pertinent labs within the past 24 hours have been reviewed.  CBC:   Recent Labs   Lab 06/13/25  0836   WBC 9.65   HGB 9.8*   HCT 31.2*        CMP:   Recent Labs   Lab 06/13/25  0836      K 3.4*      CO2 20*   GLU 84   BUN 16   CREATININE 0.6   CALCIUM 8.5*   PROT 7.2   ALBUMIN 3.0*   BILITOT 0.4   ALKPHOS 102   AST 28   ALT 34   ANIONGAP 8       Significant Imaging: I have reviewed all pertinent imaging results/findings within the past 24 hours.

## 2025-06-14 NOTE — DISCHARGE SUMMARY
"[facility] [address] [place] [address] Medicine  Discharge Summary      Patient Name: Tamiko Mercado  MRN: [id number]  HALLE: [id number]  Patient Class: OP- Observation  Admission Date: 6/13/2025  Hospital Length of Stay: 0 days  Discharge Date and Time: 06/14/2025 12:37 PM  Attending Physician: Jes att. providers found   Discharging Provider: Fili Ramey MD  Primary Care Provider: Jes, Primary Doctor  [facility] Medicine Team: [facility] HOSP MED O Fili Ramey MD  Primary Care Team: [facility] HOSP MED O    HPI:   Tamiko Mercado is a 59 y.o. female with COPD, HTN, history of CVA s/p R carotid stent with debility, anemia, chronic pain with opioid dependence, cocaine abuse with recurrent presentations for chest pain presenting with chest pain and fall.     She reports that early this morning she was reaching either for her medication or her inhaler and fell out of her bed. She noted increased lower back pain and chest pain after this, with "soreness" all over. This prompted presentation to the ED. Denies central, radiating chest pain, but feels this was due to her fall. Denies new SOB.    States that she was just discharged from [facility] a few days ago due to chest pain and "fluid on her lungs."     She endorses continued crack cocaine use, but states she has not used this in 2 weeks. Continues to smoke cigarettes.     * No surgery found *      Hospital Course:   Patient admitted with complaints of chest pain in setting of cocaine use. Has had multiple presentations for similar symptoms after cocaine abuse. Cardiac workup with PET stress in 3/2025 showed no signs of ischemia. Cardiac MRI this month at OSH suggestive of hypertrophic cardiomyopathy without obstruction. Trop mildly elevated and downtrended and without EKG changes. Did not have any recurrent chest pain while admitted. Etiology of CP likely from cocaine use. Counseled extensively on cocaine cessation. CT imaging also notable for small pleural effusions; volume status " euvolemic, likely from cocaine use. Given diuretics with good urine output. Patient eager to discharge. Pt seen and evaluated on day of discharge and deemed appropriate for discharge. Plan discussed with pt, who was agreeable and amenable; medications were discussed and reviewed, outpatient follow-up scheduled, ER precautions were given, all questions were answered to the pt's satisfaction, and patient was subsequently discharged.     Goals of Care Treatment Preferences:  Code Status: DNR      SDOH Screening:  The patient declined to be screened for utility difficulties, food insecurity, transport difficulties, housing insecurity, and interpersonal safety, so no concerns could be identified this admission.     Consults:     Assessment & Plan  Other chest pain  Patient presents with complaints of chest pain. She has multiple presentations for the same recently, likely due to cocaine abuse. Cardiac workup with PET stress in 3/2025 showed no signs of ischemia. Cardiac MRI this month at OSH suggestive of hypertrophic cardiomyopathy without obstruction. Trop mildly elevated today, trending downward, and without EKG changes. Highly suspect current presentation is again due to cocaine abuse.   -CTA shows no signs of PE or focal consolidation, and age-adjusted D-dimer suggests low probability of VTE   -trend trop, and monitor on telemetry  -continue ASA, plavix, BP control. Avoiding BB due to active cocaine use.   -advise cessation of illicit substances  Pleural effusion  Flash pulmonary edema  Pericardial effusion  Small bilateral effusions noted on CTA, with chronic pericardial effusion. BNP at baseline, although some superimposed edema noted on CXR. Recent TTE with normal LVEF, no obvious diastolic dysfunction but indeterminate.   -Highly suspect due to flash pulmonary edema from cocaine use.   -will trial a dose of lasix and monitor UOP  -currently HD stable on room air; monitor         Noted on CT, chronic,and with  interval increased enlargement today. Suspect due to cocaine use/intermittent edema given history. Recent TTE with normal LVEF, and PET stress without signs of ischemia. Recent cardiac MRI at OSH consistent with hypertrophic cardiomyopathy without obstructive pathology. Will continue diuresis as above.     Substance use  Admits to cocaine use, which is likely leading to recurrent presentations for chest pain and current presentation. Would advise cessation.     DNR (do not resuscitate)  Advance Care Planning   I spent less than 16 minutes discussing code status with patient on admission. she states that she would never want CPR or intubation/mechanical ventilation in the event of cardiopulmonary arrest, in agreement with DNR status. Her daughter is her surrogate decision maker.        HTN (hypertension)  Patient's blood pressure range in the last 24 hours was: BP  Min: 123/60  Max: 214/98.The patient's inpatient anti-hypertensive regimen is listed below:  Current Antihypertensives  amlodipine-benazepril 5-20 mg per capsule 2 capsule, Daily, Oral    Plan  - BP is uncontrolled, will adjust as follows: continue diuresis and home Amlo-benaz. Unclear other home regimen, however will hold beta blockers given cocaine abuse.  Cerebrovascular accident (CVA) due to stenosis of right carotid artery  Currently with stable deficits. S/p R carotid stent. Continue ASA + Plavix, although unclear compliance.   Hypokalemia  Patient's most recent potassium results are listed below.   Recent Labs     06/13/25  0836 06/14/25  0233   K 3.4* 3.6     Plan  - Replete potassium per protocol  - Monitor potassium Daily  - Check Mg, and replete if indicated  Normocytic anemia  Chronic, and stable around baseline without indication for transfusion. Etiology likely LEANDRO/chronic disease. Monitor.     Morbidly obese  Body mass index is 31.26 kg/m².; chronic comorbidity affecting medical decision-making. Patient would greatly benefit from weight loss  through proper diet and increased physical activity.      Pulmonary emphysema  History of COPD, not on home oxygen. Currently without evidence of acute exacerbation, and sats on room air at goal 88% or greater. Will continue hospital formulary equivalent of home regimen and monitor respiratory status. Encourage smoking cessation.   Uncomplicated opioid dependence  PDMP reviewed. Continue home regimen.     Fall  Reports falling out of her bed. Skeletal survey without acute fracture or signs of trauma. Supportive care.     Final Active Diagnoses:    Diagnosis Date Noted POA    PRINCIPAL PROBLEM:  Other chest pain [R07.89] 03/16/2025 Yes     Chronic    Normocytic anemia [D64.9] 06/13/2025 Yes    Morbidly obese [E66.01] 06/13/2025 Yes     Chronic    Pericardial effusion [I31.39] 06/13/2025 Yes     Chronic    Pulmonary emphysema [J43.9] 06/13/2025 Yes     Chronic    Uncomplicated opioid dependence [F11.20] 06/13/2025 Yes     Chronic    Pleural effusion [J90] 06/13/2025 Yes     Chronic    Flash pulmonary edema [J81.0] 06/13/2025 Yes    Fall [W19.XXXA] 06/13/2025 Yes    Hypokalemia [E87.6] 03/17/2025 Yes     Chronic    DNR (do not resuscitate) [Z66] 03/17/2025 Yes     Chronic    HTN (hypertension) [I10] 03/16/2025 Yes     Chronic    Cerebrovascular accident (CVA) due to stenosis of right carotid artery [I63.231] 03/16/2025 Yes     Chronic    Substance use [F19.90] 03/16/2025 Yes     Chronic      Problems Resolved During this Admission:       Discharged Condition: good    Disposition: Home or Self Care    Follow Up:   Follow-up Information       No, Primary Doctor .               Amber Amos CRNP Follow up on 7/14/2025.    Specialty: Internal Medicine  Why: Hospital follow up scheduled for patient on July 14, 2025 @ 2:00pm.  Contact information:  [address]  [facility] [address] [phone]                           Patient Instructions:      Ambulatory referral/consult to Cardiology   Standing Status: Future    Referral Priority: Routine Referral Type: Consultation   Referral Reason: Specialty Services Required   Requested Specialty: Cardiology   Number of Visits Requested: 1       Significant Diagnostic Studies: N/A    Pending Diagnostic Studies:       Procedure Component Value Units Date/Time    CBC auto differential [[id number]]     Order Status: Sent Lab Status: No result     Specimen: Blood     Narrative:      The following orders were created for panel order CBC auto differential.  Procedure                               Abnormality         Status                     ---------                               -----------         ------                     CBC with Differential[[id number]]                                                        Please view results for these tests on the individual orders.    CBC with Differential [[id number]]     Order Status: Sent Lab Status: No result     Specimen: Blood            Medications:  Reconciled Home Medications:      Medication List        CONTINUE taking these medications      acetaminophen 325 MG tablet  Commonly known as: TYLENOL  Take 2 tablets (650 mg total) by mouth every 8 (eight) hours as needed.     amLODIPine-benazepriL 10-40 mg per capsule  Commonly known as: LOTREL  Take 1 capsule by mouth once daily.     butalbital-acetaminophen-caffeine -40 mg -40 mg per tablet  Commonly known as: FIORICET, ESGIC  Take 1-2 tablets by mouth every 6 (six) hours as needed for Headaches.     carisoprodoL 350 MG tablet  Commonly known as: SOMA  Take 350 mg by mouth 4 (four) times daily as needed for Muscle spasms.     esomeprazole 40 MG capsule  Commonly known as: NEXIUM  Take 1 capsule (40 mg total) by mouth once daily.     gabapentin 600 MG tablet  Commonly known as: NEURONTIN  Take 600 mg by mouth 3 (three) times daily.     omeprazole 40 MG capsule  Commonly known as: PRILOSEC  Take 1 capsule (40 mg total) by mouth once daily.     oxyCODONE-acetaminophen  mg  per tablet  Commonly known as: PERCOCET  Take 1 tablet by mouth every 4 (four) hours as needed for Pain.              Indwelling Lines/Drains at time of discharge:   Lines/Drains/Airways       None                   Time spent on the discharge of patient: 35 minutes         Fili Ramey MD  Department of Hospital Medicine  [facility] 11H

## 2025-06-23 ENCOUNTER — HOSPITAL ENCOUNTER (EMERGENCY)
Facility: HOSPITAL | Age: 60
Discharge: HOME | End: 2025-06-23
Attending: EMERGENCY MEDICINE
Payer: MEDICAID

## 2025-06-23 VITALS
BODY MASS INDEX: 31.21 KG/M2 | WEIGHT: 187.31 LBS | HEIGHT: 65 IN | HEART RATE: 71 BPM | SYSTOLIC BLOOD PRESSURE: 113 MMHG | DIASTOLIC BLOOD PRESSURE: 76 MMHG | TEMPERATURE: 98 F | OXYGEN SATURATION: 99 % | RESPIRATION RATE: 18 BRPM

## 2025-06-23 DIAGNOSIS — R07.9 CHEST PAIN, UNSPECIFIED TYPE: Primary | ICD-10-CM

## 2025-06-23 DIAGNOSIS — R11.10 VOMITING: ICD-10-CM

## 2025-06-23 LAB
ABSOLUTE EOSINOPHIL (OHS): 0.17 K/UL
ABSOLUTE MONOCYTE (OHS): 0.37 K/UL (ref 0.3–1)
ABSOLUTE NEUTROPHIL COUNT (OHS): 4.1 K/UL (ref 1.8–7.7)
ALBUMIN SERPL BCP-MCNC: 3.2 G/DL (ref 3.5–5.2)
ALP SERPL-CCNC: 88 UNIT/L (ref 40–150)
ALT SERPL W/O P-5'-P-CCNC: 8 UNIT/L (ref 10–44)
ANION GAP (OHS): 14 MMOL/L (ref 8–16)
AST SERPL-CCNC: 9 UNIT/L (ref 11–45)
BASOPHILS # BLD AUTO: 0.03 K/UL
BASOPHILS NFR BLD AUTO: 0.5 %
BILIRUB SERPL-MCNC: 0.4 MG/DL (ref 0.1–1)
BNP SERPL-MCNC: 69 PG/ML (ref 0–99)
BUN SERPL-MCNC: 15 MG/DL (ref 6–20)
CALCIUM SERPL-MCNC: 9.4 MG/DL (ref 8.7–10.5)
CHLORIDE SERPL-SCNC: 106 MMOL/L (ref 95–110)
CO2 SERPL-SCNC: 21 MMOL/L (ref 23–29)
CREAT SERPL-MCNC: 1.3 MG/DL (ref 0.5–1.4)
ERYTHROCYTE [DISTWIDTH] IN BLOOD BY AUTOMATED COUNT: 12.8 % (ref 11.5–14.5)
GFR SERPLBLD CREATININE-BSD FMLA CKD-EPI: 47 ML/MIN/1.73/M2
GLUCOSE SERPL-MCNC: 95 MG/DL (ref 70–110)
HCT VFR BLD AUTO: 30.3 % (ref 37–48.5)
HGB BLD-MCNC: 9.6 GM/DL (ref 12–16)
IMM GRANULOCYTES # BLD AUTO: 0.02 K/UL (ref 0–0.04)
IMM GRANULOCYTES NFR BLD AUTO: 0.3 % (ref 0–0.5)
LIPASE SERPL-CCNC: 6 U/L (ref 4–60)
LYMPHOCYTES # BLD AUTO: 1.19 K/UL (ref 1–4.8)
MCH RBC QN AUTO: 25.7 PG (ref 27–31)
MCHC RBC AUTO-ENTMCNC: 31.7 G/DL (ref 32–36)
MCV RBC AUTO: 81 FL (ref 82–98)
NUCLEATED RBC (/100WBC) (OHS): 0 /100 WBC
OHS QRS DURATION: 76 MS
OHS QTC CALCULATION: 490 MS
PLATELET # BLD AUTO: 472 K/UL (ref 150–450)
PMV BLD AUTO: 9.2 FL (ref 9.2–12.9)
POTASSIUM SERPL-SCNC: 4 MMOL/L (ref 3.5–5.1)
PROT SERPL-MCNC: 8.2 GM/DL (ref 6–8.4)
RBC # BLD AUTO: 3.74 M/UL (ref 4–5.4)
RELATIVE EOSINOPHIL (OHS): 2.9 %
RELATIVE LYMPHOCYTE (OHS): 20.2 % (ref 18–48)
RELATIVE MONOCYTE (OHS): 6.3 % (ref 4–15)
RELATIVE NEUTROPHIL (OHS): 69.8 % (ref 38–73)
SODIUM SERPL-SCNC: 141 MMOL/L (ref 136–145)
TROPONIN I SERPL HS-MCNC: 3 NG/L
TROPONIN I SERPL HS-MCNC: <3 NG/L
WBC # BLD AUTO: 5.88 K/UL (ref 3.9–12.7)

## 2025-06-23 PROCEDURE — 84484 ASSAY OF TROPONIN QUANT: CPT | Performed by: EMERGENCY MEDICINE

## 2025-06-23 PROCEDURE — 93005 ELECTROCARDIOGRAM TRACING: CPT

## 2025-06-23 PROCEDURE — 85025 COMPLETE CBC W/AUTO DIFF WBC: CPT | Performed by: EMERGENCY MEDICINE

## 2025-06-23 PROCEDURE — 99284 EMERGENCY DEPT VISIT MOD MDM: CPT | Mod: 25

## 2025-06-23 PROCEDURE — 93010 ELECTROCARDIOGRAM REPORT: CPT | Mod: ,,, | Performed by: STUDENT IN AN ORGANIZED HEALTH CARE EDUCATION/TRAINING PROGRAM

## 2025-06-23 PROCEDURE — 82040 ASSAY OF SERUM ALBUMIN: CPT | Performed by: EMERGENCY MEDICINE

## 2025-06-23 PROCEDURE — 84484 ASSAY OF TROPONIN QUANT: CPT

## 2025-06-23 PROCEDURE — 83880 ASSAY OF NATRIURETIC PEPTIDE: CPT

## 2025-06-23 PROCEDURE — 83690 ASSAY OF LIPASE: CPT | Performed by: EMERGENCY MEDICINE

## 2025-06-23 NOTE — ED TRIAGE NOTES
58 y/o F presents to ER with c/c vomiting and hypotension. States she was at dental appt and vomited once. Also endorses hypotension this morning with dizziness. Denies c/p, SOB, fevers and chills

## 2025-06-23 NOTE — PROVIDER PROGRESS NOTES - EMERGENCY DEPT.
Encounter Date: 6/23/2025    ED Physician Progress Notes          ED Physician Hand-off Note:    ED Course: I assumed care of patient from off-going ED physicians.  Briefly, Patient is a 58 yo female with hx of COPD, HTN, history of CVA s/p R carotid stent with debility, anemia, chronic pain with opioid dependence, cocaine abuse presenting with an episode of nausea, vomiting, and chest pain today. Chest pain resolved after self administration of SL nitro.     At the time of signout plan was pending repeat troponin. Repeat troponin wnl. Pt denies chest pain. Feels well. Heart score is 3.     Disposition: Discharge    Patient comfortable with this plan. Patient counseled regarding exam, results, diagnosis, treatment, and plan. Instructed to follow up with PCP ASAP and she expressed understanding.      Final diagnoses:  [R11.10] Vomiting  [R07.9] Chest pain, unspecified type (Primary)

## 2025-06-23 NOTE — ED NOTES
Bed: [facility]  Expected date: 6/23/25  Expected time:   Means of arrival:   Comments:  sofia

## 2025-06-23 NOTE — ED PROVIDER NOTES
Encounter Date: 2025       History     Chief Complaint   Patient presents with    Emesis     X1 at dentist office. Pt. Reports after vomiting she felt better     Tamiko Mercado is a 59 y.o. female with COPD, HTN, history of CVA s/p R carotid stent with debility, anemia, chronic pain with opioid dependence, cocaine abuse presenting with nausea.  Patient went to the dentist today at the dentist's office had sudden onset chest pain followed by nausea with vomiting.  States that after the episode of vomiting she took a nitro tablet and the chest pain went away.  States that it has been awhile since she took her nitro tablet for similar symptoms.  Denies denies chills, hematemesis.        Review of patient's allergies indicates:  No Known Allergies  Past Medical History:   Diagnosis Date    Arthritis     Asthma     as child    Cerebrovascular accident (CVA) due to stenosis of right carotid artery 3/16/2025    CVA (cerebral vascular accident)     Hyperlipemia     Hypertension      Past Surgical History:   Procedure Laterality Date     SECTION, CLASSIC       Family History   Problem Relation Name Age of Onset    Stroke Mother      Hypertension Mother      Heart attack Father       Social History[1]  Review of Systems  ROS negative except as noted in HPI     Physical Exam     Initial Vitals [25 1234]   BP Pulse Resp Temp SpO2   117/69 78 18 98.1 °F (36.7 °C) 99 %      MAP       --         Physical Exam    Nursing note and vitals reviewed.  Constitutional: She appears well-developed. No distress.   HENT:   Head: Normocephalic.   Missing muliple teeth, no abcess   Eyes: Conjunctivae are normal.   Cardiovascular:  Normal rate, regular rhythm and normal heart sounds.           Pulmonary/Chest: Breath sounds normal. No respiratory distress.   Abdominal: Abdomen is soft. She exhibits no distension.   Musculoskeletal:         General: No edema. Normal range of motion.     Neurological: She is alert and oriented  to person, place, and time. She has normal strength.   Skin: Skin is warm. Capillary refill takes less than 2 seconds.   Psychiatric: She has a normal mood and affect.         ED Course   Procedures  Labs Reviewed   COMPREHENSIVE METABOLIC PANEL - Abnormal       Result Value    Sodium 141      Potassium 4.0      Chloride 106      CO2 21 (*)     Glucose 95      BUN 15      Creatinine 1.3      Calcium 9.4      Protein Total 8.2      Albumin 3.2 (*)     Bilirubin Total 0.4      ALP 88      AST 9 (*)     ALT 8 (*)     Anion Gap 14      eGFR 47 (*)    CBC WITH DIFFERENTIAL - Abnormal    WBC 5.88      RBC 3.74 (*)     HGB 9.6 (*)     HCT 30.3 (*)     MCV 81 (*)     MCH 25.7 (*)     MCHC 31.7 (*)     RDW 12.8      Platelet Count 472 (*)     MPV 9.2      Nucleated RBC 0      Neut % 69.8      Lymph % 20.2      Mono % 6.3      Eos % 2.9      Basophil % 0.5      Imm Grans % 0.3      Neut # 4.10      Lymph # 1.19      Mono # 0.37      Eos # 0.17      Baso # 0.03      Imm Grans # 0.02     LIPASE - Normal    Lipase Level 6     B-TYPE NATRIURETIC PEPTIDE - Normal    BNP 69     TROPONIN I HIGH SENSITIVITY - Normal    Troponin High Sensitive 3     TROPONIN I HIGH SENSITIVITY - Normal    Troponin High Sensitive <3     CBC W/ AUTO DIFFERENTIAL    Narrative:     The following orders were created for panel order CBC W/ AUTO DIFFERENTIAL.  Procedure                               Abnormality         Status                     ---------                               -----------         ------                     CBC with Differential[[id number]]       Abnormal            Final result                 Please view results for these tests on the individual orders.        ECG Results              EKG 12-lead (Final result)        Collection Time Result Time QRS Duration O[facility] QTC Calculation    06/23/25 12:39:11 06/23/25 13:24:55 76 490                     Final result by Interface, Lab In [facility] (06/23/25 13:25:04)                   Narrative:     Test Reason : R11.10,    Vent. Rate :  70 BPM     Atrial Rate :  70 BPM     P-R Int : 168 ms          QRS Dur :  76 ms      QT Int : 454 ms       P-R-T Axes :  75  45  80 degrees    QTcB Int : 490 ms    Normal sinus rhythm  Possible Left atrial enlargement  Prolonged QT  Abnormal ECG  When compared with ECG of 13-Jun-2025 15:38,  Nonspecific T wave abnormality, improved in Lateral leads  QT has lengthened  Confirmed by Jamie Harris ([id number]) on 6/23/2025 1:24:54 PM    Referred By:            Confirmed By: Jamie Harris                                  Imaging Results    None          Medications - No data to display  Medical Decision Making  Tamiko Mercado is a 59 y.o. female with COPD, HTN, history of CVA s/p R carotid stent with debility, anemia, chronic pain with opioid dependence, cocaine abuse presenting with nausea.    On initial evaluation patient is speaking in full sentences, cooperative with exam. Vitals within normal limits.     Differential: Gastroenteritis, ACS, Hypokalemia.     At time of sign-out patient pending laboratory evaluation and symptom resolution.  Patient did have symptom resolution after nitroglycerin tablet.  This is concerning for possible unstable angina.  We are performing serial troponins.     Oncoming ER team to follow up serial troponin and final disposition pending.    Amount and/or Complexity of Data Reviewed  Labs: ordered. Decision-making details documented in ED Course.  ECG/medicine tests:  Decision-making details documented in ED Course.              Attending Attestation:   Physician Attestation Statement for Resident:  As the supervising MD   Physician Attestation Statement: I have personally seen and examined this patient.   I agree with the above history.  -:   As the supervising MD I agree with the above PE.     As the supervising MD I agree with the above treatment, course, plan, and disposition.                Attending ED Notes:   STAFF ATTENDING  PHYSICIAN NOTE:  I have individually/jointly evaluated Tamiko Mercado and discussed their ED management with the resident physician. I have also reviewed their notes, assessments, and procedures documented.  I was present during all critical portions of any procedure(s) performed on Tamiko Mercado.   ____________________  Nikolay Adan MD, [facility]  Emergency Medicine Staff        ED Course as of 06/30/25 1507   Mon Jun 23, 2025   1442 CBC W/ AUTO DIFFERENTIAL(!)  Stable anemia, platelets also similar when compared to old CBC. [TK]   1442 EKG 12-lead  EKG independently interpreted by me demonstrates normal sinus rhythm, there are T-wave inversions in V1 and V2.  These inversions were previously present. [TK]      ED Course User Index  [TK] Joel Rosales DO                           Clinical Impression:  Final diagnoses:  [R11.10] Vomiting  [R07.9] Chest pain, unspecified type (Primary)          ED Disposition Condition    Discharge Stable          ED Prescriptions    None       Follow-up Information       Follow up With Specialties Details Why Contact Info Additional Information    [facility] Internal Medicine Call in 2 days  [address] [facility] [address] [phone] [facility] Please park in surface lot and check in at central registration desk    [facility] - Emergency Dept Emergency Medicine  If symptoms worsen [address] [facility] [id number] [phone]                  Joel Rosales DO  Resident  06/23/25 1552         [1]   Social History  Tobacco Use    Smoking status: Some Days     Current packs/day: 0.50     Types: Cigarettes    Smokeless tobacco: Never   Substance Use Topics    Alcohol use: Yes     Comment: occas, none recently    Drug use: Yes     Types: Cocaine     Comment: smoke crack cocaine 4 days PTA        Kiran Adan MD  06/30/25 2902

## 2025-09-04 DIAGNOSIS — I69.30 SEQUELAE OF CEREBRAL INFARCTION: ICD-10-CM

## 2025-09-04 DIAGNOSIS — Z86.73 HISTORY OF CVA (CEREBROVASCULAR ACCIDENT): Primary | ICD-10-CM
